# Patient Record
Sex: MALE | Race: WHITE | NOT HISPANIC OR LATINO
[De-identification: names, ages, dates, MRNs, and addresses within clinical notes are randomized per-mention and may not be internally consistent; named-entity substitution may affect disease eponyms.]

---

## 2019-09-19 ENCOUNTER — TRANSCRIPTION ENCOUNTER (OUTPATIENT)
Age: 44
End: 2019-09-19

## 2019-09-30 ENCOUNTER — OUTPATIENT (OUTPATIENT)
Dept: OUTPATIENT SERVICES | Facility: HOSPITAL | Age: 44
LOS: 1 days | End: 2019-09-30
Payer: COMMERCIAL

## 2019-09-30 DIAGNOSIS — Z98.890 OTHER SPECIFIED POSTPROCEDURAL STATES: Chronic | ICD-10-CM

## 2019-09-30 DIAGNOSIS — Z01.818 ENCOUNTER FOR OTHER PREPROCEDURAL EXAMINATION: ICD-10-CM

## 2019-09-30 DIAGNOSIS — Z98.52 VASECTOMY STATUS: Chronic | ICD-10-CM

## 2019-09-30 DIAGNOSIS — Z90.49 ACQUIRED ABSENCE OF OTHER SPECIFIED PARTS OF DIGESTIVE TRACT: Chronic | ICD-10-CM

## 2019-09-30 DIAGNOSIS — J32.9 CHRONIC SINUSITIS, UNSPECIFIED: ICD-10-CM

## 2019-09-30 LAB
ANION GAP SERPL CALC-SCNC: 9 MMOL/L — SIGNIFICANT CHANGE UP (ref 5–17)
BASOPHILS # BLD AUTO: 0.04 K/UL — SIGNIFICANT CHANGE UP (ref 0–0.2)
BASOPHILS NFR BLD AUTO: 0.6 % — SIGNIFICANT CHANGE UP (ref 0–2)
BUN SERPL-MCNC: 15 MG/DL — SIGNIFICANT CHANGE UP (ref 7–23)
CALCIUM SERPL-MCNC: 9 MG/DL — SIGNIFICANT CHANGE UP (ref 8.5–10.1)
CHLORIDE SERPL-SCNC: 104 MMOL/L — SIGNIFICANT CHANGE UP (ref 96–108)
CO2 SERPL-SCNC: 24 MMOL/L — SIGNIFICANT CHANGE UP (ref 22–31)
CREAT SERPL-MCNC: 0.83 MG/DL — SIGNIFICANT CHANGE UP (ref 0.5–1.3)
EOSINOPHIL # BLD AUTO: 0.12 K/UL — SIGNIFICANT CHANGE UP (ref 0–0.5)
EOSINOPHIL NFR BLD AUTO: 1.9 % — SIGNIFICANT CHANGE UP (ref 0–6)
GLUCOSE SERPL-MCNC: 91 MG/DL — SIGNIFICANT CHANGE UP (ref 70–99)
HCT VFR BLD CALC: 43.6 % — SIGNIFICANT CHANGE UP (ref 39–50)
HGB BLD-MCNC: 14.7 G/DL — SIGNIFICANT CHANGE UP (ref 13–17)
IMM GRANULOCYTES NFR BLD AUTO: 0.5 % — SIGNIFICANT CHANGE UP (ref 0–1.5)
LYMPHOCYTES # BLD AUTO: 1.81 K/UL — SIGNIFICANT CHANGE UP (ref 1–3.3)
LYMPHOCYTES # BLD AUTO: 28.1 % — SIGNIFICANT CHANGE UP (ref 13–44)
MCHC RBC-ENTMCNC: 28.2 PG — SIGNIFICANT CHANGE UP (ref 27–34)
MCHC RBC-ENTMCNC: 33.7 GM/DL — SIGNIFICANT CHANGE UP (ref 32–36)
MCV RBC AUTO: 83.5 FL — SIGNIFICANT CHANGE UP (ref 80–100)
MONOCYTES # BLD AUTO: 0.75 K/UL — SIGNIFICANT CHANGE UP (ref 0–0.9)
MONOCYTES NFR BLD AUTO: 11.6 % — SIGNIFICANT CHANGE UP (ref 2–14)
NEUTROPHILS # BLD AUTO: 3.7 K/UL — SIGNIFICANT CHANGE UP (ref 1.8–7.4)
NEUTROPHILS NFR BLD AUTO: 57.3 % — SIGNIFICANT CHANGE UP (ref 43–77)
PLATELET # BLD AUTO: 245 K/UL — SIGNIFICANT CHANGE UP (ref 150–400)
POTASSIUM SERPL-MCNC: 3.3 MMOL/L — LOW (ref 3.5–5.3)
POTASSIUM SERPL-SCNC: 3.3 MMOL/L — LOW (ref 3.5–5.3)
RBC # BLD: 5.22 M/UL — SIGNIFICANT CHANGE UP (ref 4.2–5.8)
RBC # FLD: 13 % — SIGNIFICANT CHANGE UP (ref 10.3–14.5)
SODIUM SERPL-SCNC: 137 MMOL/L — SIGNIFICANT CHANGE UP (ref 135–145)
WBC # BLD: 6.45 K/UL — SIGNIFICANT CHANGE UP (ref 3.8–10.5)
WBC # FLD AUTO: 6.45 K/UL — SIGNIFICANT CHANGE UP (ref 3.8–10.5)

## 2019-09-30 PROCEDURE — 71046 X-RAY EXAM CHEST 2 VIEWS: CPT

## 2019-09-30 PROCEDURE — 36415 COLL VENOUS BLD VENIPUNCTURE: CPT

## 2019-09-30 PROCEDURE — 71046 X-RAY EXAM CHEST 2 VIEWS: CPT | Mod: 26

## 2019-09-30 PROCEDURE — 85025 COMPLETE CBC W/AUTO DIFF WBC: CPT

## 2019-09-30 PROCEDURE — 80048 BASIC METABOLIC PNL TOTAL CA: CPT

## 2019-09-30 PROCEDURE — 93010 ELECTROCARDIOGRAM REPORT: CPT

## 2019-09-30 PROCEDURE — 93005 ELECTROCARDIOGRAM TRACING: CPT

## 2019-09-30 NOTE — ASU PATIENT PROFILE, ADULT - PMH
Hypertension    Migraine    CHRISTINA (obstructive sleep apnea) Deviated septum    Hypertension    Migraine    CHRISTINA (obstructive sleep apnea)

## 2019-09-30 NOTE — ASU PATIENT PROFILE, ADULT - PSH
H/O vasectomy  2009  History of surgery  retrieval of sperm after vasectomy  S/P cholecystectomy  2004

## 2019-09-30 NOTE — CHART NOTE - NSCHARTNOTEFT_GEN_A_CORE
44 year old male presents to PST for planned septoplasty and excision of deidra bullosa    Plan:  1. PST instructions given ; NPO post midnight   2. Pt instructed to take following meds with sip of water : propranolol telmisartan amlodipine   3. EZ wash instructions given   4. Medical Optimization  with Dr Prieto Zabala   5. Stop NSAIDS ( Aspirin Alev Motrin Mobic Diclofenac), herbal supplements , MVI , Vitamin fish oil 7 days prior to surgery  unless directed by surgeon or cardiologist;   6. Labs EKG CXR as per surgeon request

## 2019-10-01 DIAGNOSIS — J32.9 CHRONIC SINUSITIS, UNSPECIFIED: ICD-10-CM

## 2019-10-01 DIAGNOSIS — Z01.818 ENCOUNTER FOR OTHER PREPROCEDURAL EXAMINATION: ICD-10-CM

## 2019-10-09 ENCOUNTER — OUTPATIENT (OUTPATIENT)
Dept: INPATIENT UNIT | Facility: HOSPITAL | Age: 44
LOS: 1 days | Discharge: HOME | End: 2019-10-09
Payer: COMMERCIAL

## 2019-10-09 VITALS
DIASTOLIC BLOOD PRESSURE: 76 MMHG | HEART RATE: 57 BPM | SYSTOLIC BLOOD PRESSURE: 132 MMHG | RESPIRATION RATE: 16 BRPM | OXYGEN SATURATION: 96 % | TEMPERATURE: 98 F

## 2019-10-09 VITALS
HEIGHT: 69 IN | DIASTOLIC BLOOD PRESSURE: 81 MMHG | OXYGEN SATURATION: 96 % | TEMPERATURE: 98 F | SYSTOLIC BLOOD PRESSURE: 127 MMHG | HEART RATE: 62 BPM | RESPIRATION RATE: 16 BRPM | WEIGHT: 255.07 LBS

## 2019-10-09 DIAGNOSIS — Z90.49 ACQUIRED ABSENCE OF OTHER SPECIFIED PARTS OF DIGESTIVE TRACT: Chronic | ICD-10-CM

## 2019-10-09 DIAGNOSIS — J34.89 OTHER SPECIFIED DISORDERS OF NOSE AND NASAL SINUSES: ICD-10-CM

## 2019-10-09 DIAGNOSIS — Z98.890 OTHER SPECIFIED POSTPROCEDURAL STATES: Chronic | ICD-10-CM

## 2019-10-09 DIAGNOSIS — Z98.52 VASECTOMY STATUS: Chronic | ICD-10-CM

## 2019-10-09 DIAGNOSIS — J32.9 CHRONIC SINUSITIS, UNSPECIFIED: ICD-10-CM

## 2019-10-09 PROCEDURE — 88300 SURGICAL PATH GROSS: CPT | Mod: 26

## 2019-10-09 PROCEDURE — 88300 SURGICAL PATH GROSS: CPT

## 2019-10-09 PROCEDURE — C1889: CPT

## 2019-10-09 RX ORDER — FENTANYL CITRATE 50 UG/ML
50 INJECTION INTRAVENOUS
Refills: 0 | Status: DISCONTINUED | OUTPATIENT
Start: 2019-10-09 | End: 2019-10-09

## 2019-10-09 RX ORDER — ONDANSETRON 8 MG/1
4 TABLET, FILM COATED ORAL ONCE
Refills: 0 | Status: DISCONTINUED | OUTPATIENT
Start: 2019-10-09 | End: 2019-10-09

## 2019-10-09 RX ORDER — SODIUM CHLORIDE 9 MG/ML
1000 INJECTION, SOLUTION INTRAVENOUS
Refills: 0 | Status: DISCONTINUED | OUTPATIENT
Start: 2019-10-09 | End: 2019-10-09

## 2019-10-09 RX ORDER — OXYCODONE HYDROCHLORIDE 5 MG/1
5 TABLET ORAL ONCE
Refills: 0 | Status: DISCONTINUED | OUTPATIENT
Start: 2019-10-09 | End: 2019-10-09

## 2019-10-09 RX ORDER — OXYCODONE HYDROCHLORIDE 5 MG/1
10 TABLET ORAL ONCE
Refills: 0 | Status: DISCONTINUED | OUTPATIENT
Start: 2019-10-09 | End: 2019-10-09

## 2019-10-09 RX ADMIN — OXYCODONE HYDROCHLORIDE 10 MILLIGRAM(S): 5 TABLET ORAL at 17:16

## 2019-10-09 RX ADMIN — SODIUM CHLORIDE 125 MILLILITER(S): 9 INJECTION, SOLUTION INTRAVENOUS at 16:50

## 2019-10-09 NOTE — BRIEF OPERATIVE NOTE - NSICDXBRIEFPOSTOP_GEN_ALL_CORE_FT
POST-OP DIAGNOSIS:  Deviated septum 09-Oct-2019 15:19:59  Chevy Choi  Ratna bullosa 09-Oct-2019 15:20:09  Chevy Choi

## 2019-10-09 NOTE — BRIEF OPERATIVE NOTE - NSICDXBRIEFPREOP_GEN_ALL_CORE_FT
PRE-OP DIAGNOSIS:  Deviated septum 09-Oct-2019 15:19:38  Chevy Choi  Ratna bullosa 09-Oct-2019 15:19:51  Chevy Choi

## 2019-10-15 DIAGNOSIS — J34.89 OTHER SPECIFIED DISORDERS OF NOSE AND NASAL SINUSES: ICD-10-CM

## 2019-10-15 DIAGNOSIS — G47.33 OBSTRUCTIVE SLEEP APNEA (ADULT) (PEDIATRIC): ICD-10-CM

## 2019-10-15 DIAGNOSIS — J34.2 DEVIATED NASAL SEPTUM: ICD-10-CM

## 2019-10-15 DIAGNOSIS — G43.709 CHRONIC MIGRAINE WITHOUT AURA, NOT INTRACTABLE, WITHOUT STATUS MIGRAINOSUS: ICD-10-CM

## 2019-10-15 DIAGNOSIS — Z90.49 ACQUIRED ABSENCE OF OTHER SPECIFIED PARTS OF DIGESTIVE TRACT: ICD-10-CM

## 2019-10-15 DIAGNOSIS — I10 ESSENTIAL (PRIMARY) HYPERTENSION: ICD-10-CM

## 2019-10-15 DIAGNOSIS — Z99.89 DEPENDENCE ON OTHER ENABLING MACHINES AND DEVICES: ICD-10-CM

## 2019-10-15 DIAGNOSIS — Z98.52 VASECTOMY STATUS: ICD-10-CM

## 2019-10-15 DIAGNOSIS — K21.9 GASTRO-ESOPHAGEAL REFLUX DISEASE WITHOUT ESOPHAGITIS: ICD-10-CM

## 2019-10-15 DIAGNOSIS — E66.01 MORBID (SEVERE) OBESITY DUE TO EXCESS CALORIES: ICD-10-CM

## 2022-11-04 PROBLEM — G43.909 MIGRAINE, UNSPECIFIED, NOT INTRACTABLE, WITHOUT STATUS MIGRAINOSUS: Chronic | Status: ACTIVE | Noted: 2019-09-30

## 2022-11-04 PROBLEM — J34.2 DEVIATED NASAL SEPTUM: Chronic | Status: ACTIVE | Noted: 2019-09-30

## 2022-11-04 PROBLEM — I10 ESSENTIAL (PRIMARY) HYPERTENSION: Chronic | Status: ACTIVE | Noted: 2019-09-30

## 2022-11-04 PROBLEM — G47.33 OBSTRUCTIVE SLEEP APNEA (ADULT) (PEDIATRIC): Chronic | Status: ACTIVE | Noted: 2019-09-30

## 2022-11-08 ENCOUNTER — OUTPATIENT (OUTPATIENT)
Dept: PREADMISSION | Facility: HOSPITAL | Age: 47
LOS: 1 days | Discharge: HOME | End: 2022-11-08
Payer: COMMERCIAL

## 2022-11-08 VITALS
HEART RATE: 56 BPM | OXYGEN SATURATION: 98 % | HEIGHT: 69 IN | TEMPERATURE: 98 F | DIASTOLIC BLOOD PRESSURE: 82 MMHG | WEIGHT: 265 LBS | SYSTOLIC BLOOD PRESSURE: 129 MMHG | RESPIRATION RATE: 17 BRPM

## 2022-11-08 DIAGNOSIS — Z98.52 VASECTOMY STATUS: Chronic | ICD-10-CM

## 2022-11-08 DIAGNOSIS — Z98.890 OTHER SPECIFIED POSTPROCEDURAL STATES: Chronic | ICD-10-CM

## 2022-11-08 DIAGNOSIS — Z90.49 ACQUIRED ABSENCE OF OTHER SPECIFIED PARTS OF DIGESTIVE TRACT: Chronic | ICD-10-CM

## 2022-11-08 DIAGNOSIS — R19.5 OTHER FECAL ABNORMALITIES: ICD-10-CM

## 2022-11-08 PROCEDURE — C1889: CPT

## 2022-11-08 PROCEDURE — 88305 TISSUE EXAM BY PATHOLOGIST: CPT | Mod: 26

## 2022-11-08 PROCEDURE — 88305 TISSUE EXAM BY PATHOLOGIST: CPT

## 2022-11-08 RX ORDER — CALCIUM CARBONATE 500(1250)
0 TABLET ORAL
Qty: 0 | Refills: 0 | DISCHARGE

## 2022-11-08 NOTE — ASU PATIENT PROFILE, ADULT - NSICDXPASTMEDICALHX_GEN_ALL_CORE_FT
PAST MEDICAL HISTORY:  Deviated septum     Hypertension     Migraine     CHRISTINA (obstructive sleep apnea)

## 2022-11-08 NOTE — ASU PATIENT PROFILE, ADULT - FALL HARM RISK - UNIVERSAL INTERVENTIONS
Bed in lowest position, wheels locked, appropriate side rails in place/Call bell, personal items and telephone in reach/Instruct patient to call for assistance before getting out of bed or chair/Non-slip footwear when patient is out of bed/[place] to call system/Physically safe environment - no spills, clutter or unnecessary equipment/Purposeful Proactive Rounding/Room/bathroom lighting operational, light cord in reach

## 2022-11-08 NOTE — ASU PATIENT PROFILE, ADULT - DOMESTIC TRAVEL HIGH RISK QUESTION
Bedside and Verbal shift change report given to Lore Bernabe (oncoming nurse) by Arnol Cortez (offgoing nurse). Report included the following information SBAR, Kardex, Intake/Output, MAR and Recent Results. No

## 2022-11-11 DIAGNOSIS — Z83.71 FAMILY HISTORY OF COLONIC POLYPS: ICD-10-CM

## 2022-11-11 DIAGNOSIS — I10 ESSENTIAL (PRIMARY) HYPERTENSION: ICD-10-CM

## 2022-11-11 DIAGNOSIS — Z80.0 FAMILY HISTORY OF MALIGNANT NEOPLASM OF DIGESTIVE ORGANS: ICD-10-CM

## 2022-11-11 DIAGNOSIS — G47.33 OBSTRUCTIVE SLEEP APNEA (ADULT) (PEDIATRIC): ICD-10-CM

## 2022-11-11 DIAGNOSIS — K62.9 DISEASE OF ANUS AND RECTUM, UNSPECIFIED: ICD-10-CM

## 2022-11-11 DIAGNOSIS — K57.30 DIVERTICULOSIS OF LARGE INTESTINE WITHOUT PERFORATION OR ABSCESS WITHOUT BLEEDING: ICD-10-CM

## 2022-11-11 DIAGNOSIS — D12.5 BENIGN NEOPLASM OF SIGMOID COLON: ICD-10-CM

## 2023-10-22 ENCOUNTER — INPATIENT (INPATIENT)
Facility: HOSPITAL | Age: 48
LOS: 3 days | Discharge: HOME | DRG: 391 | End: 2023-10-26
Attending: SURGERY | Admitting: SURGERY
Payer: COMMERCIAL

## 2023-10-22 VITALS — WEIGHT: 276.9 LBS

## 2023-10-22 DIAGNOSIS — K57.20 DIVERTICULITIS OF LARGE INTESTINE WITH PERFORATION AND ABSCESS WITHOUT BLEEDING: ICD-10-CM

## 2023-10-22 DIAGNOSIS — Z90.49 ACQUIRED ABSENCE OF OTHER SPECIFIED PARTS OF DIGESTIVE TRACT: Chronic | ICD-10-CM

## 2023-10-22 DIAGNOSIS — Z98.890 OTHER SPECIFIED POSTPROCEDURAL STATES: Chronic | ICD-10-CM

## 2023-10-22 DIAGNOSIS — Z98.52 VASECTOMY STATUS: Chronic | ICD-10-CM

## 2023-10-22 LAB
ALBUMIN SERPL ELPH-MCNC: 3.7 G/DL — SIGNIFICANT CHANGE UP (ref 3.3–5)
ALBUMIN SERPL ELPH-MCNC: 3.7 G/DL — SIGNIFICANT CHANGE UP (ref 3.3–5)
ALP SERPL-CCNC: 88 U/L — SIGNIFICANT CHANGE UP (ref 40–120)
ALP SERPL-CCNC: 88 U/L — SIGNIFICANT CHANGE UP (ref 40–120)
ALT FLD-CCNC: 62 U/L — SIGNIFICANT CHANGE UP (ref 12–78)
ALT FLD-CCNC: 62 U/L — SIGNIFICANT CHANGE UP (ref 12–78)
ANION GAP SERPL CALC-SCNC: 7 MMOL/L — SIGNIFICANT CHANGE UP (ref 5–17)
ANION GAP SERPL CALC-SCNC: 7 MMOL/L — SIGNIFICANT CHANGE UP (ref 5–17)
APPEARANCE UR: CLEAR — SIGNIFICANT CHANGE UP
APPEARANCE UR: CLEAR — SIGNIFICANT CHANGE UP
AST SERPL-CCNC: 15 U/L — SIGNIFICANT CHANGE UP (ref 15–37)
AST SERPL-CCNC: 15 U/L — SIGNIFICANT CHANGE UP (ref 15–37)
BASOPHILS # BLD AUTO: 0 K/UL — SIGNIFICANT CHANGE UP (ref 0–0.2)
BASOPHILS # BLD AUTO: 0 K/UL — SIGNIFICANT CHANGE UP (ref 0–0.2)
BASOPHILS NFR BLD AUTO: 0 % — SIGNIFICANT CHANGE UP (ref 0–2)
BASOPHILS NFR BLD AUTO: 0 % — SIGNIFICANT CHANGE UP (ref 0–2)
BILIRUB SERPL-MCNC: 0.9 MG/DL — SIGNIFICANT CHANGE UP (ref 0.2–1.2)
BILIRUB SERPL-MCNC: 0.9 MG/DL — SIGNIFICANT CHANGE UP (ref 0.2–1.2)
BILIRUB UR-MCNC: NEGATIVE — SIGNIFICANT CHANGE UP
BILIRUB UR-MCNC: NEGATIVE — SIGNIFICANT CHANGE UP
BUN SERPL-MCNC: 11 MG/DL — SIGNIFICANT CHANGE UP (ref 7–23)
BUN SERPL-MCNC: 11 MG/DL — SIGNIFICANT CHANGE UP (ref 7–23)
CALCIUM SERPL-MCNC: 9.3 MG/DL — SIGNIFICANT CHANGE UP (ref 8.5–10.1)
CALCIUM SERPL-MCNC: 9.3 MG/DL — SIGNIFICANT CHANGE UP (ref 8.5–10.1)
CHLORIDE SERPL-SCNC: 101 MMOL/L — SIGNIFICANT CHANGE UP (ref 96–108)
CHLORIDE SERPL-SCNC: 101 MMOL/L — SIGNIFICANT CHANGE UP (ref 96–108)
CO2 SERPL-SCNC: 28 MMOL/L — SIGNIFICANT CHANGE UP (ref 22–31)
CO2 SERPL-SCNC: 28 MMOL/L — SIGNIFICANT CHANGE UP (ref 22–31)
COLOR SPEC: YELLOW — SIGNIFICANT CHANGE UP
COLOR SPEC: YELLOW — SIGNIFICANT CHANGE UP
CREAT SERPL-MCNC: 0.81 MG/DL — SIGNIFICANT CHANGE UP (ref 0.5–1.3)
CREAT SERPL-MCNC: 0.81 MG/DL — SIGNIFICANT CHANGE UP (ref 0.5–1.3)
DIFF PNL FLD: NEGATIVE — SIGNIFICANT CHANGE UP
DIFF PNL FLD: NEGATIVE — SIGNIFICANT CHANGE UP
EGFR: 110 ML/MIN/1.73M2 — SIGNIFICANT CHANGE UP
EGFR: 110 ML/MIN/1.73M2 — SIGNIFICANT CHANGE UP
EOSINOPHIL # BLD AUTO: 0 K/UL — SIGNIFICANT CHANGE UP (ref 0–0.5)
EOSINOPHIL # BLD AUTO: 0 K/UL — SIGNIFICANT CHANGE UP (ref 0–0.5)
EOSINOPHIL NFR BLD AUTO: 0 % — SIGNIFICANT CHANGE UP (ref 0–6)
EOSINOPHIL NFR BLD AUTO: 0 % — SIGNIFICANT CHANGE UP (ref 0–6)
GLUCOSE SERPL-MCNC: 89 MG/DL — SIGNIFICANT CHANGE UP (ref 70–99)
GLUCOSE SERPL-MCNC: 89 MG/DL — SIGNIFICANT CHANGE UP (ref 70–99)
GLUCOSE UR QL: NEGATIVE MG/DL — SIGNIFICANT CHANGE UP
GLUCOSE UR QL: NEGATIVE MG/DL — SIGNIFICANT CHANGE UP
HCT VFR BLD CALC: 42.6 % — SIGNIFICANT CHANGE UP (ref 39–50)
HCT VFR BLD CALC: 42.6 % — SIGNIFICANT CHANGE UP (ref 39–50)
HGB BLD-MCNC: 14.5 G/DL — SIGNIFICANT CHANGE UP (ref 13–17)
HGB BLD-MCNC: 14.5 G/DL — SIGNIFICANT CHANGE UP (ref 13–17)
KETONES UR-MCNC: NEGATIVE MG/DL — SIGNIFICANT CHANGE UP
KETONES UR-MCNC: NEGATIVE MG/DL — SIGNIFICANT CHANGE UP
LEUKOCYTE ESTERASE UR-ACNC: NEGATIVE — SIGNIFICANT CHANGE UP
LEUKOCYTE ESTERASE UR-ACNC: NEGATIVE — SIGNIFICANT CHANGE UP
LYMPHOCYTES # BLD AUTO: 1.49 K/UL — SIGNIFICANT CHANGE UP (ref 1–3.3)
LYMPHOCYTES # BLD AUTO: 1.49 K/UL — SIGNIFICANT CHANGE UP (ref 1–3.3)
LYMPHOCYTES # BLD AUTO: 13 % — SIGNIFICANT CHANGE UP (ref 13–44)
LYMPHOCYTES # BLD AUTO: 13 % — SIGNIFICANT CHANGE UP (ref 13–44)
MANUAL SMEAR VERIFICATION: SIGNIFICANT CHANGE UP
MANUAL SMEAR VERIFICATION: SIGNIFICANT CHANGE UP
MCHC RBC-ENTMCNC: 28.4 PG — SIGNIFICANT CHANGE UP (ref 27–34)
MCHC RBC-ENTMCNC: 28.4 PG — SIGNIFICANT CHANGE UP (ref 27–34)
MCHC RBC-ENTMCNC: 34 GM/DL — SIGNIFICANT CHANGE UP (ref 32–36)
MCHC RBC-ENTMCNC: 34 GM/DL — SIGNIFICANT CHANGE UP (ref 32–36)
MCV RBC AUTO: 83.4 FL — SIGNIFICANT CHANGE UP (ref 80–100)
MCV RBC AUTO: 83.4 FL — SIGNIFICANT CHANGE UP (ref 80–100)
MONOCYTES # BLD AUTO: 1.84 K/UL — HIGH (ref 0–0.9)
MONOCYTES # BLD AUTO: 1.84 K/UL — HIGH (ref 0–0.9)
MONOCYTES NFR BLD AUTO: 16 % — HIGH (ref 2–14)
MONOCYTES NFR BLD AUTO: 16 % — HIGH (ref 2–14)
NEUTROPHILS # BLD AUTO: 8.16 K/UL — HIGH (ref 1.8–7.4)
NEUTROPHILS # BLD AUTO: 8.16 K/UL — HIGH (ref 1.8–7.4)
NEUTROPHILS NFR BLD AUTO: 71 % — SIGNIFICANT CHANGE UP (ref 43–77)
NEUTROPHILS NFR BLD AUTO: 71 % — SIGNIFICANT CHANGE UP (ref 43–77)
NITRITE UR-MCNC: NEGATIVE — SIGNIFICANT CHANGE UP
NITRITE UR-MCNC: NEGATIVE — SIGNIFICANT CHANGE UP
NRBC # BLD: 0 /100 — SIGNIFICANT CHANGE UP (ref 0–0)
NRBC # BLD: 0 /100 — SIGNIFICANT CHANGE UP (ref 0–0)
NRBC # BLD: SIGNIFICANT CHANGE UP /100 WBCS (ref 0–0)
NRBC # BLD: SIGNIFICANT CHANGE UP /100 WBCS (ref 0–0)
PH UR: 7 — SIGNIFICANT CHANGE UP (ref 5–8)
PH UR: 7 — SIGNIFICANT CHANGE UP (ref 5–8)
PLAT MORPH BLD: NORMAL — SIGNIFICANT CHANGE UP
PLAT MORPH BLD: NORMAL — SIGNIFICANT CHANGE UP
PLATELET # BLD AUTO: 285 K/UL — SIGNIFICANT CHANGE UP (ref 150–400)
PLATELET # BLD AUTO: 285 K/UL — SIGNIFICANT CHANGE UP (ref 150–400)
POTASSIUM SERPL-MCNC: 3.5 MMOL/L — SIGNIFICANT CHANGE UP (ref 3.5–5.3)
POTASSIUM SERPL-MCNC: 3.5 MMOL/L — SIGNIFICANT CHANGE UP (ref 3.5–5.3)
POTASSIUM SERPL-SCNC: 3.5 MMOL/L — SIGNIFICANT CHANGE UP (ref 3.5–5.3)
POTASSIUM SERPL-SCNC: 3.5 MMOL/L — SIGNIFICANT CHANGE UP (ref 3.5–5.3)
PROT SERPL-MCNC: 7.7 GM/DL — SIGNIFICANT CHANGE UP (ref 6–8.3)
PROT SERPL-MCNC: 7.7 GM/DL — SIGNIFICANT CHANGE UP (ref 6–8.3)
PROT UR-MCNC: NEGATIVE MG/DL — SIGNIFICANT CHANGE UP
PROT UR-MCNC: NEGATIVE MG/DL — SIGNIFICANT CHANGE UP
RBC # BLD: 5.11 M/UL — SIGNIFICANT CHANGE UP (ref 4.2–5.8)
RBC # BLD: 5.11 M/UL — SIGNIFICANT CHANGE UP (ref 4.2–5.8)
RBC # FLD: 13.1 % — SIGNIFICANT CHANGE UP (ref 10.3–14.5)
RBC # FLD: 13.1 % — SIGNIFICANT CHANGE UP (ref 10.3–14.5)
RBC BLD AUTO: NORMAL — SIGNIFICANT CHANGE UP
RBC BLD AUTO: NORMAL — SIGNIFICANT CHANGE UP
SODIUM SERPL-SCNC: 136 MMOL/L — SIGNIFICANT CHANGE UP (ref 135–145)
SODIUM SERPL-SCNC: 136 MMOL/L — SIGNIFICANT CHANGE UP (ref 135–145)
SP GR SPEC: 1.01 — SIGNIFICANT CHANGE UP (ref 1–1.03)
SP GR SPEC: 1.01 — SIGNIFICANT CHANGE UP (ref 1–1.03)
UROBILINOGEN FLD QL: 1 MG/DL — SIGNIFICANT CHANGE UP (ref 0.2–1)
UROBILINOGEN FLD QL: 1 MG/DL — SIGNIFICANT CHANGE UP (ref 0.2–1)
WBC # BLD: 11.49 K/UL — HIGH (ref 3.8–10.5)
WBC # BLD: 11.49 K/UL — HIGH (ref 3.8–10.5)
WBC # FLD AUTO: 11.49 K/UL — HIGH (ref 3.8–10.5)
WBC # FLD AUTO: 11.49 K/UL — HIGH (ref 3.8–10.5)

## 2023-10-22 PROCEDURE — 99285 EMERGENCY DEPT VISIT HI MDM: CPT

## 2023-10-22 PROCEDURE — 12345: CPT | Mod: NC

## 2023-10-22 PROCEDURE — 80048 BASIC METABOLIC PNL TOTAL CA: CPT

## 2023-10-22 PROCEDURE — 85025 COMPLETE CBC W/AUTO DIFF WBC: CPT

## 2023-10-22 PROCEDURE — 74176 CT ABD & PELVIS W/O CONTRAST: CPT | Mod: 26,MA

## 2023-10-22 PROCEDURE — 93005 ELECTROCARDIOGRAM TRACING: CPT

## 2023-10-22 PROCEDURE — 36415 COLL VENOUS BLD VENIPUNCTURE: CPT

## 2023-10-22 RX ORDER — PIPERACILLIN AND TAZOBACTAM 4; .5 G/20ML; G/20ML
3.38 INJECTION, POWDER, LYOPHILIZED, FOR SOLUTION INTRAVENOUS EVERY 8 HOURS
Refills: 0 | Status: DISCONTINUED | OUTPATIENT
Start: 2023-10-23 | End: 2023-10-26

## 2023-10-22 RX ORDER — HEPARIN SODIUM 5000 [USP'U]/ML
5000 INJECTION INTRAVENOUS; SUBCUTANEOUS EVERY 8 HOURS
Refills: 0 | Status: DISCONTINUED | OUTPATIENT
Start: 2023-10-22 | End: 2023-10-26

## 2023-10-22 RX ORDER — INFLUENZA VIRUS VACCINE 15; 15; 15; 15 UG/.5ML; UG/.5ML; UG/.5ML; UG/.5ML
0.5 SUSPENSION INTRAMUSCULAR ONCE
Refills: 0 | Status: COMPLETED | OUTPATIENT
Start: 2023-10-22 | End: 2023-10-22

## 2023-10-22 RX ORDER — CIPROFLOXACIN LACTATE 400MG/40ML
400 VIAL (ML) INTRAVENOUS ONCE
Refills: 0 | Status: COMPLETED | OUTPATIENT
Start: 2023-10-22 | End: 2023-10-22

## 2023-10-22 RX ORDER — SODIUM CHLORIDE 9 MG/ML
1000 INJECTION INTRAMUSCULAR; INTRAVENOUS; SUBCUTANEOUS
Refills: 0 | Status: DISCONTINUED | OUTPATIENT
Start: 2023-10-22 | End: 2023-10-22

## 2023-10-22 RX ORDER — CALCIUM CARBONATE 500(1250)
1 TABLET ORAL
Refills: 0 | DISCHARGE

## 2023-10-22 RX ORDER — CHLORTHALIDONE 50 MG
25 TABLET ORAL DAILY
Refills: 0 | Status: DISCONTINUED | OUTPATIENT
Start: 2023-10-22 | End: 2023-10-23

## 2023-10-22 RX ORDER — AMLODIPINE BESYLATE 2.5 MG/1
5 TABLET ORAL DAILY
Refills: 0 | Status: DISCONTINUED | OUTPATIENT
Start: 2023-10-22 | End: 2023-10-26

## 2023-10-22 RX ORDER — PROPRANOLOL HCL 160 MG
80 CAPSULE, EXTENDED RELEASE 24HR ORAL DAILY
Refills: 0 | Status: DISCONTINUED | OUTPATIENT
Start: 2023-10-22 | End: 2023-10-26

## 2023-10-22 RX ORDER — SODIUM CHLORIDE 9 MG/ML
1000 INJECTION INTRAMUSCULAR; INTRAVENOUS; SUBCUTANEOUS ONCE
Refills: 0 | Status: COMPLETED | OUTPATIENT
Start: 2023-10-22 | End: 2023-10-22

## 2023-10-22 RX ORDER — MORPHINE SULFATE 50 MG/1
2 CAPSULE, EXTENDED RELEASE ORAL EVERY 4 HOURS
Refills: 0 | Status: DISCONTINUED | OUTPATIENT
Start: 2023-10-22 | End: 2023-10-22

## 2023-10-22 RX ORDER — LOSARTAN POTASSIUM 100 MG/1
50 TABLET, FILM COATED ORAL DAILY
Refills: 0 | Status: DISCONTINUED | OUTPATIENT
Start: 2023-10-22 | End: 2023-10-23

## 2023-10-22 RX ORDER — AMLODIPINE BESYLATE 2.5 MG/1
1 TABLET ORAL
Qty: 0 | Refills: 0 | DISCHARGE

## 2023-10-22 RX ORDER — PROPRANOLOL HCL 160 MG
1 CAPSULE, EXTENDED RELEASE 24HR ORAL
Qty: 0 | Refills: 0 | DISCHARGE

## 2023-10-22 RX ORDER — TELMISARTAN 20 MG/1
40 TABLET ORAL DAILY
Refills: 0 | Status: DISCONTINUED | OUTPATIENT
Start: 2023-10-22 | End: 2023-10-26

## 2023-10-22 RX ORDER — DEXTROSE MONOHYDRATE, SODIUM CHLORIDE, AND POTASSIUM CHLORIDE 50; .745; 4.5 G/1000ML; G/1000ML; G/1000ML
1000 INJECTION, SOLUTION INTRAVENOUS
Refills: 0 | Status: DISCONTINUED | OUTPATIENT
Start: 2023-10-22 | End: 2023-10-26

## 2023-10-22 RX ORDER — TELMISARTAN 20 MG/1
1 TABLET ORAL
Qty: 0 | Refills: 0 | DISCHARGE

## 2023-10-22 RX ORDER — PIPERACILLIN AND TAZOBACTAM 4; .5 G/20ML; G/20ML
3.38 INJECTION, POWDER, LYOPHILIZED, FOR SOLUTION INTRAVENOUS ONCE
Refills: 0 | Status: COMPLETED | OUTPATIENT
Start: 2023-10-22 | End: 2023-10-22

## 2023-10-22 RX ORDER — ONDANSETRON 8 MG/1
4 TABLET, FILM COATED ORAL EVERY 6 HOURS
Refills: 0 | Status: DISCONTINUED | OUTPATIENT
Start: 2023-10-22 | End: 2023-10-26

## 2023-10-22 RX ORDER — PIPERACILLIN AND TAZOBACTAM 4; .5 G/20ML; G/20ML
3.38 INJECTION, POWDER, LYOPHILIZED, FOR SOLUTION INTRAVENOUS ONCE
Refills: 0 | Status: COMPLETED | OUTPATIENT
Start: 2023-10-23 | End: 2023-10-23

## 2023-10-22 RX ORDER — ACETAMINOPHEN 500 MG
1000 TABLET ORAL ONCE
Refills: 0 | Status: COMPLETED | OUTPATIENT
Start: 2023-10-22 | End: 2023-10-24

## 2023-10-22 RX ORDER — TELMISARTAN 20 MG/1
1 TABLET ORAL
Refills: 0 | DISCHARGE

## 2023-10-22 RX ORDER — METRONIDAZOLE 500 MG
500 TABLET ORAL ONCE
Refills: 0 | Status: COMPLETED | OUTPATIENT
Start: 2023-10-22 | End: 2023-10-22

## 2023-10-22 RX ADMIN — PIPERACILLIN AND TAZOBACTAM 200 GRAM(S): 4; .5 INJECTION, POWDER, LYOPHILIZED, FOR SOLUTION INTRAVENOUS at 22:23

## 2023-10-22 RX ADMIN — DEXTROSE MONOHYDRATE, SODIUM CHLORIDE, AND POTASSIUM CHLORIDE 125 MILLILITER(S): 50; .745; 4.5 INJECTION, SOLUTION INTRAVENOUS at 20:35

## 2023-10-22 RX ADMIN — Medication 200 MILLIGRAM(S): at 20:25

## 2023-10-22 RX ADMIN — SODIUM CHLORIDE 1000 MILLILITER(S): 9 INJECTION INTRAMUSCULAR; INTRAVENOUS; SUBCUTANEOUS at 18:06

## 2023-10-22 RX ADMIN — HEPARIN SODIUM 5000 UNIT(S): 5000 INJECTION INTRAVENOUS; SUBCUTANEOUS at 21:02

## 2023-10-22 RX ADMIN — SODIUM CHLORIDE 125 MILLILITER(S): 9 INJECTION INTRAMUSCULAR; INTRAVENOUS; SUBCUTANEOUS at 19:19

## 2023-10-22 RX ADMIN — Medication 100 MILLIGRAM(S): at 19:18

## 2023-10-22 NOTE — ED ADULT NURSE NOTE - PRIMARY CARE PROVIDER
[facility] phone call message- medication clarification/question:    Full Medication Name: testosterone (ANDROGEL 1.62 % PUMP) 20.25 MG/ACT gel    Question: The pharmacy states the NDC is  or not valid anymore and would like a call back to discuss options.    Pharmacy confirmed as      [id number] - [place], [place] - [address]    : Yes    OK to leave a message on voice mail? Yes    Primary language: English      needed? No    Call taken on Ruba 15, 2023 at 3:43 PM by Sandra Gasca     see md note

## 2023-10-22 NOTE — ED ADULT NURSE NOTE - OBJECTIVE STATEMENT
pt presenting for abd pain for a week, now w/diarrhea. denies abd HX, went to UC and sent here, primary is out of town

## 2023-10-22 NOTE — H&P ADULT - NSHPPHYSICALEXAM_GEN_ALL_CORE
VSS afeb  49 yo male WDWN in NAD  skin- warm,dry  HEENT- WNL  Neck- no JVD  Lungs- clear  cor- RRR  Abd- + BS soft, tender LLQ, guards to deep palpation  Ext- no edema  Neuro A and O X 3 no deficits

## 2023-10-22 NOTE — ED ADULT TRIAGE NOTE - CHIEF COMPLAINT QUOTE
pt presents to ED due to complaints of abdominal pain x 1 week LLQ sent by urgent care for sono or CT diarrhea today

## 2023-10-22 NOTE — ED STATDOCS - PROGRESS NOTE DETAILS
pt aware of lab and ct results, Dr. Child at bedside and will admit for further management.  -Madyson Polo PA-C

## 2023-10-22 NOTE — ED STATDOCS - PHYSICAL EXAMINATION
Constitutional: NAD AOx3  Eyes: PERRL EOMI  Head: Normocephalic atraumatic  Mouth: MMM  Cardiac: regular rate and rhythm  Resp: Lungs CTAB  GI: Abd s/nd/ttp LLQ and suprapubic  Neuro: CN2-12 grossly intact, MCKENNA x 4  Skin: No visible rashes

## 2023-10-22 NOTE — ED ADULT NURSE NOTE - NSFALLRISK_ED_ALL_ED
The history is provided by the mother. Illness   The current episode started yesterday. The onset was gradual. The problem occurs occasionally. The problem has been unchanged. The problem is mild. Nothing relieves the symptoms. Associated symptoms include a fever, ear pain, rhinorrhea and URI. Pertinent negatives include no abdominal pain, no constipation, no diarrhea, no vomiting, no congestion, no ear discharge, no sore throat, no stridor, no cough, no wheezing, no rash, no eye discharge and no eye pain. Review of Systems   Constitutional: Positive for fever and irritability. Negative for activity change and appetite change. HENT: Positive for ear pain and rhinorrhea. Negative for congestion, ear discharge and sore throat. Eyes: Negative for pain and discharge. Respiratory: Negative for cough, wheezing and stridor. Cardiovascular: Negative for cyanosis. Gastrointestinal: Negative for abdominal distention, abdominal pain, constipation, diarrhea and vomiting. Genitourinary: Negative for decreased urine volume, dysuria and frequency. Skin: Negative for rash and wound. Neurological: Negative for weakness. All other systems reviewed and are negative. Physical Exam  Vitals and nursing note reviewed. Constitutional:       General: She is active. She is not in acute distress. Appearance: She is well-developed. She is not diaphoretic. HENT:      Right Ear: Tympanic membrane and external ear normal.      Left Ear: External ear normal. A middle ear effusion is present. Tympanic membrane is erythematous and bulging. Nose: Mucosal edema and rhinorrhea present. Mouth/Throat:      Mouth: Mucous membranes are moist.      Pharynx: Oropharynx is clear. Tonsils: No tonsillar exudate. Eyes:      Conjunctiva/sclera: Conjunctivae normal.      Pupils: Pupils are equal, round, and reactive to light. Cardiovascular:      Rate and Rhythm: Normal rate and regular rhythm. Heart sounds: S1 normal and S2 normal. No murmur heard. Pulmonary:      Effort: Pulmonary effort is normal. No respiratory distress or retractions. Breath sounds: Normal breath sounds. No stridor. No wheezing. Abdominal:      General: Bowel sounds are normal.      Palpations: Abdomen is soft. Tenderness: There is no abdominal tenderness. There is no guarding or rebound. Musculoskeletal:         General: Normal range of motion. Cervical back: Normal range of motion and neck supple. Skin:     General: Skin is warm. Findings: No petechiae or rash. Neurological:      Mental Status: She is alert. Motor: No abnormal muscle tone.        --------------------------------------------- PAST HISTORY ---------------------------------------------  Past Medical History:  has no past medical history on file. Past Surgical History:  has no past surgical history on file. Social History:      Family History: family history is not on file. The patients home medications have been reviewed. Allergies: Patient has no known allergies. -------------------------------------------------- RESULTS -------------------------------------------------  No results found for this visit on 08/23/21. No orders to display       ------------------------- NURSING NOTES AND VITALS REVIEWED ---------------------------   The nursing notes within the ED encounter and vital signs as below have been reviewed. Pulse 160 Comment: pt upset and crying  Temp 97.8 °F (36.6 °C) (Temporal)   Resp 20 Comment: crying  Wt 21 lb 12.8 oz (9.888 kg)   SpO2 97%   Oxygen Saturation Interpretation: Normal      ------------------------------------------ PROGRESS NOTES ------------------------------------------   I have spoken with the mother and discussed todays results, in addition to providing specific details for the plan of care and counseling regarding the diagnosis and prognosis.   Their questions are answered at this time and they are agreeable with the plan.      --------------------------------- ADDITIONAL PROVIDER NOTES ---------------------------------        This patient is stable for discharge. I have shared the specific conditions for return, as well as the importance of follow-up. IMPRESSION:     1. Non-recurrent acute serous otitis media of left ear    2.  Upper respiratory tract infection, unspecified type      Patient's Medications   New Prescriptions    AMOXICILLIN (AMOXIL) 200 MG/5ML SUSPENSION    Take 5.6 mLs by mouth 2 times daily for 10 days    IBUPROFEN (CHILDRENS ADVIL) 100 MG/5ML SUSPENSION    Take 2.5 mLs by mouth every 6 hours as needed for Pain or Fever    PREDNISOLONE (ORAPRED) 15 MG/5ML SOLUTION    Take 3.3 mLs by mouth daily for 5 doses   Previous Medications    No medications on file   Modified Medications    No medications on file   Discontinued Medications    No medications on file         Procedures     [facility]                  Ida Das, DO  08/23/21 [id number] No

## 2023-10-22 NOTE — ED STATDOCS - NS ED ATTENDING STATEMENT MOD
This was a shared visit with the KELSIE. I reviewed and verified the documentation and independently performed the documented:

## 2023-10-22 NOTE — ED STATDOCS - OBJECTIVE STATEMENT
49 yo M with pmhx htn migraine, cholecystectomy with c/o few days of LLQ pain. no fever. No n/v. +diarrhea. Pain comes and goes and is very sharp, radiates to bladder area. No testicle pain or back pain. No urine symptoms. No hx of kidney stone or diverticulitis. Went to UC and sent to ED for further eval.

## 2023-10-22 NOTE — H&P ADULT - NSHPLABSRESULTS_GEN_ALL_CORE
< from: CT Abdomen and Pelvis No Cont (10.22.23 @ 18:47) >    IMPRESSION:    Limited noncontrast study.    Acute mid sigmoid colon diverticulitis. Trace adjacent droplets of   extraluminal air and 2.2 cm perisigmoid air/fluid on image 39 series 602,   indicating localized perforation. No drainable fluid collection. No   distant free air. Follow to resolution. Recommend colonoscopy once acute   symptoms have resolved.    No hydronephrosis or obstructing ureteral calculus. 2 mm nonobstructing   left renal calculus.    Sub-4 mm pulmonary nodules can be followed with dedicated chest CT in 12   months, or as per patient risk factors.    < end of copied text >

## 2023-10-22 NOTE — PHARMACOTHERAPY INTERVENTION NOTE - COMMENTS
Medication history complete. Medications and allergies reviewed with patient and confirmed with . Patient uses a CPAP machine.

## 2023-10-22 NOTE — ED STATDOCS - NSICDXPASTSURGICALHX_GEN_ALL_CORE_FT
PAST SURGICAL HISTORY:  H/O vasectomy 2009    History of surgery retrieval of sperm after vasectomy    S/P cholecystectomy 2004

## 2023-10-22 NOTE — H&P ADULT - ASSESSMENT
47 yo male with diverticulitis and microperforation. Plan admit. NPO. IV hydration. IV abx. GI and medicine consult. Serial abdominal exams and follow up labs

## 2023-10-22 NOTE — ED ADULT NURSE NOTE - NSFALLUNIVINTERV_ED_ALL_ED
Bed/Stretcher in lowest position, wheels locked, appropriate side rails in place/Call bell, personal items and telephone in reach/Instruct patient to call for assistance before getting out of bed/chair/stretcher/Non-slip footwear applied when patient is off stretcher/[place] to call system/Physically safe environment - no spills, clutter or unnecessary equipment/Purposeful proactive rounding/Room/bathroom lighting operational, light cord in reach

## 2023-10-22 NOTE — H&P ADULT - HISTORY OF PRESENT ILLNESS
ED 47 yo M with pmhx htn migraine, cholecystectomy with c/o few days of LLQ pain. no fever. No n/v. +diarrhea. Pain comes and goes and is very sharp, radiates to bladder area. No testicle pain or back pain. No urine symptoms. No hx of kidney stone or diverticulitis. Went to  and sent to ED for further eval.

## 2023-10-23 LAB
ANION GAP SERPL CALC-SCNC: 4 MMOL/L — LOW (ref 5–17)
ANION GAP SERPL CALC-SCNC: 4 MMOL/L — LOW (ref 5–17)
BASOPHILS # BLD AUTO: 0.03 K/UL — SIGNIFICANT CHANGE UP (ref 0–0.2)
BASOPHILS # BLD AUTO: 0.03 K/UL — SIGNIFICANT CHANGE UP (ref 0–0.2)
BASOPHILS NFR BLD AUTO: 0.3 % — SIGNIFICANT CHANGE UP (ref 0–2)
BASOPHILS NFR BLD AUTO: 0.3 % — SIGNIFICANT CHANGE UP (ref 0–2)
BUN SERPL-MCNC: 10 MG/DL — SIGNIFICANT CHANGE UP (ref 7–23)
BUN SERPL-MCNC: 10 MG/DL — SIGNIFICANT CHANGE UP (ref 7–23)
CALCIUM SERPL-MCNC: 9 MG/DL — SIGNIFICANT CHANGE UP (ref 8.5–10.1)
CALCIUM SERPL-MCNC: 9 MG/DL — SIGNIFICANT CHANGE UP (ref 8.5–10.1)
CHLORIDE SERPL-SCNC: 106 MMOL/L — SIGNIFICANT CHANGE UP (ref 96–108)
CHLORIDE SERPL-SCNC: 106 MMOL/L — SIGNIFICANT CHANGE UP (ref 96–108)
CO2 SERPL-SCNC: 28 MMOL/L — SIGNIFICANT CHANGE UP (ref 22–31)
CO2 SERPL-SCNC: 28 MMOL/L — SIGNIFICANT CHANGE UP (ref 22–31)
CREAT SERPL-MCNC: 0.8 MG/DL — SIGNIFICANT CHANGE UP (ref 0.5–1.3)
CREAT SERPL-MCNC: 0.8 MG/DL — SIGNIFICANT CHANGE UP (ref 0.5–1.3)
CULTURE RESULTS: SIGNIFICANT CHANGE UP
CULTURE RESULTS: SIGNIFICANT CHANGE UP
EGFR: 111 ML/MIN/1.73M2 — SIGNIFICANT CHANGE UP
EGFR: 111 ML/MIN/1.73M2 — SIGNIFICANT CHANGE UP
EOSINOPHIL # BLD AUTO: 0.07 K/UL — SIGNIFICANT CHANGE UP (ref 0–0.5)
EOSINOPHIL # BLD AUTO: 0.07 K/UL — SIGNIFICANT CHANGE UP (ref 0–0.5)
EOSINOPHIL NFR BLD AUTO: 0.8 % — SIGNIFICANT CHANGE UP (ref 0–6)
EOSINOPHIL NFR BLD AUTO: 0.8 % — SIGNIFICANT CHANGE UP (ref 0–6)
GLUCOSE SERPL-MCNC: 107 MG/DL — HIGH (ref 70–99)
GLUCOSE SERPL-MCNC: 107 MG/DL — HIGH (ref 70–99)
HCT VFR BLD CALC: 39.3 % — SIGNIFICANT CHANGE UP (ref 39–50)
HCT VFR BLD CALC: 39.3 % — SIGNIFICANT CHANGE UP (ref 39–50)
HGB BLD-MCNC: 13 G/DL — SIGNIFICANT CHANGE UP (ref 13–17)
HGB BLD-MCNC: 13 G/DL — SIGNIFICANT CHANGE UP (ref 13–17)
IMM GRANULOCYTES NFR BLD AUTO: 0.6 % — SIGNIFICANT CHANGE UP (ref 0–0.9)
IMM GRANULOCYTES NFR BLD AUTO: 0.6 % — SIGNIFICANT CHANGE UP (ref 0–0.9)
LYMPHOCYTES # BLD AUTO: 1.24 K/UL — SIGNIFICANT CHANGE UP (ref 1–3.3)
LYMPHOCYTES # BLD AUTO: 1.24 K/UL — SIGNIFICANT CHANGE UP (ref 1–3.3)
LYMPHOCYTES # BLD AUTO: 14.4 % — SIGNIFICANT CHANGE UP (ref 13–44)
LYMPHOCYTES # BLD AUTO: 14.4 % — SIGNIFICANT CHANGE UP (ref 13–44)
MCHC RBC-ENTMCNC: 28.3 PG — SIGNIFICANT CHANGE UP (ref 27–34)
MCHC RBC-ENTMCNC: 28.3 PG — SIGNIFICANT CHANGE UP (ref 27–34)
MCHC RBC-ENTMCNC: 33.1 GM/DL — SIGNIFICANT CHANGE UP (ref 32–36)
MCHC RBC-ENTMCNC: 33.1 GM/DL — SIGNIFICANT CHANGE UP (ref 32–36)
MCV RBC AUTO: 85.4 FL — SIGNIFICANT CHANGE UP (ref 80–100)
MCV RBC AUTO: 85.4 FL — SIGNIFICANT CHANGE UP (ref 80–100)
MONOCYTES # BLD AUTO: 1.29 K/UL — HIGH (ref 0–0.9)
MONOCYTES # BLD AUTO: 1.29 K/UL — HIGH (ref 0–0.9)
MONOCYTES NFR BLD AUTO: 15 % — HIGH (ref 2–14)
MONOCYTES NFR BLD AUTO: 15 % — HIGH (ref 2–14)
NEUTROPHILS # BLD AUTO: 5.94 K/UL — SIGNIFICANT CHANGE UP (ref 1.8–7.4)
NEUTROPHILS # BLD AUTO: 5.94 K/UL — SIGNIFICANT CHANGE UP (ref 1.8–7.4)
NEUTROPHILS NFR BLD AUTO: 68.9 % — SIGNIFICANT CHANGE UP (ref 43–77)
NEUTROPHILS NFR BLD AUTO: 68.9 % — SIGNIFICANT CHANGE UP (ref 43–77)
PLATELET # BLD AUTO: 243 K/UL — SIGNIFICANT CHANGE UP (ref 150–400)
PLATELET # BLD AUTO: 243 K/UL — SIGNIFICANT CHANGE UP (ref 150–400)
POTASSIUM SERPL-MCNC: 3.7 MMOL/L — SIGNIFICANT CHANGE UP (ref 3.5–5.3)
POTASSIUM SERPL-MCNC: 3.7 MMOL/L — SIGNIFICANT CHANGE UP (ref 3.5–5.3)
POTASSIUM SERPL-SCNC: 3.7 MMOL/L — SIGNIFICANT CHANGE UP (ref 3.5–5.3)
POTASSIUM SERPL-SCNC: 3.7 MMOL/L — SIGNIFICANT CHANGE UP (ref 3.5–5.3)
RBC # BLD: 4.6 M/UL — SIGNIFICANT CHANGE UP (ref 4.2–5.8)
RBC # BLD: 4.6 M/UL — SIGNIFICANT CHANGE UP (ref 4.2–5.8)
RBC # FLD: 13.3 % — SIGNIFICANT CHANGE UP (ref 10.3–14.5)
RBC # FLD: 13.3 % — SIGNIFICANT CHANGE UP (ref 10.3–14.5)
SODIUM SERPL-SCNC: 138 MMOL/L — SIGNIFICANT CHANGE UP (ref 135–145)
SODIUM SERPL-SCNC: 138 MMOL/L — SIGNIFICANT CHANGE UP (ref 135–145)
SPECIMEN SOURCE: SIGNIFICANT CHANGE UP
SPECIMEN SOURCE: SIGNIFICANT CHANGE UP
WBC # BLD: 8.62 K/UL — SIGNIFICANT CHANGE UP (ref 3.8–10.5)
WBC # BLD: 8.62 K/UL — SIGNIFICANT CHANGE UP (ref 3.8–10.5)
WBC # FLD AUTO: 8.62 K/UL — SIGNIFICANT CHANGE UP (ref 3.8–10.5)
WBC # FLD AUTO: 8.62 K/UL — SIGNIFICANT CHANGE UP (ref 3.8–10.5)

## 2023-10-23 PROCEDURE — 93010 ELECTROCARDIOGRAM REPORT: CPT

## 2023-10-23 PROCEDURE — 99221 1ST HOSP IP/OBS SF/LOW 40: CPT

## 2023-10-23 RX ADMIN — TELMISARTAN 40 MILLIGRAM(S): 20 TABLET ORAL at 10:18

## 2023-10-23 RX ADMIN — DEXTROSE MONOHYDRATE, SODIUM CHLORIDE, AND POTASSIUM CHLORIDE 125 MILLILITER(S): 50; .745; 4.5 INJECTION, SOLUTION INTRAVENOUS at 12:39

## 2023-10-23 RX ADMIN — Medication 80 MILLIGRAM(S): at 10:17

## 2023-10-23 RX ADMIN — PIPERACILLIN AND TAZOBACTAM 25 GRAM(S): 4; .5 INJECTION, POWDER, LYOPHILIZED, FOR SOLUTION INTRAVENOUS at 18:06

## 2023-10-23 RX ADMIN — HEPARIN SODIUM 5000 UNIT(S): 5000 INJECTION INTRAVENOUS; SUBCUTANEOUS at 21:47

## 2023-10-23 RX ADMIN — AMLODIPINE BESYLATE 5 MILLIGRAM(S): 2.5 TABLET ORAL at 10:17

## 2023-10-23 RX ADMIN — HEPARIN SODIUM 5000 UNIT(S): 5000 INJECTION INTRAVENOUS; SUBCUTANEOUS at 14:17

## 2023-10-23 RX ADMIN — PIPERACILLIN AND TAZOBACTAM 25 GRAM(S): 4; .5 INJECTION, POWDER, LYOPHILIZED, FOR SOLUTION INTRAVENOUS at 10:17

## 2023-10-23 RX ADMIN — HEPARIN SODIUM 5000 UNIT(S): 5000 INJECTION INTRAVENOUS; SUBCUTANEOUS at 05:46

## 2023-10-23 RX ADMIN — Medication 25 MILLIGRAM(S): at 10:17

## 2023-10-24 LAB
ANION GAP SERPL CALC-SCNC: 4 MMOL/L — LOW (ref 5–17)
ANION GAP SERPL CALC-SCNC: 4 MMOL/L — LOW (ref 5–17)
BASOPHILS # BLD AUTO: 0.03 K/UL — SIGNIFICANT CHANGE UP (ref 0–0.2)
BASOPHILS # BLD AUTO: 0.03 K/UL — SIGNIFICANT CHANGE UP (ref 0–0.2)
BASOPHILS NFR BLD AUTO: 0.4 % — SIGNIFICANT CHANGE UP (ref 0–2)
BASOPHILS NFR BLD AUTO: 0.4 % — SIGNIFICANT CHANGE UP (ref 0–2)
BUN SERPL-MCNC: 5 MG/DL — LOW (ref 7–23)
BUN SERPL-MCNC: 5 MG/DL — LOW (ref 7–23)
CALCIUM SERPL-MCNC: 8.7 MG/DL — SIGNIFICANT CHANGE UP (ref 8.5–10.1)
CALCIUM SERPL-MCNC: 8.7 MG/DL — SIGNIFICANT CHANGE UP (ref 8.5–10.1)
CHLORIDE SERPL-SCNC: 108 MMOL/L — SIGNIFICANT CHANGE UP (ref 96–108)
CHLORIDE SERPL-SCNC: 108 MMOL/L — SIGNIFICANT CHANGE UP (ref 96–108)
CO2 SERPL-SCNC: 28 MMOL/L — SIGNIFICANT CHANGE UP (ref 22–31)
CO2 SERPL-SCNC: 28 MMOL/L — SIGNIFICANT CHANGE UP (ref 22–31)
CREAT SERPL-MCNC: 0.72 MG/DL — SIGNIFICANT CHANGE UP (ref 0.5–1.3)
CREAT SERPL-MCNC: 0.72 MG/DL — SIGNIFICANT CHANGE UP (ref 0.5–1.3)
EGFR: 114 ML/MIN/1.73M2 — SIGNIFICANT CHANGE UP
EGFR: 114 ML/MIN/1.73M2 — SIGNIFICANT CHANGE UP
EOSINOPHIL # BLD AUTO: 0.12 K/UL — SIGNIFICANT CHANGE UP (ref 0–0.5)
EOSINOPHIL # BLD AUTO: 0.12 K/UL — SIGNIFICANT CHANGE UP (ref 0–0.5)
EOSINOPHIL NFR BLD AUTO: 1.7 % — SIGNIFICANT CHANGE UP (ref 0–6)
EOSINOPHIL NFR BLD AUTO: 1.7 % — SIGNIFICANT CHANGE UP (ref 0–6)
GLUCOSE SERPL-MCNC: 97 MG/DL — SIGNIFICANT CHANGE UP (ref 70–99)
GLUCOSE SERPL-MCNC: 97 MG/DL — SIGNIFICANT CHANGE UP (ref 70–99)
HCT VFR BLD CALC: 39.8 % — SIGNIFICANT CHANGE UP (ref 39–50)
HCT VFR BLD CALC: 39.8 % — SIGNIFICANT CHANGE UP (ref 39–50)
HGB BLD-MCNC: 13.1 G/DL — SIGNIFICANT CHANGE UP (ref 13–17)
HGB BLD-MCNC: 13.1 G/DL — SIGNIFICANT CHANGE UP (ref 13–17)
IMM GRANULOCYTES NFR BLD AUTO: 0.4 % — SIGNIFICANT CHANGE UP (ref 0–0.9)
IMM GRANULOCYTES NFR BLD AUTO: 0.4 % — SIGNIFICANT CHANGE UP (ref 0–0.9)
LYMPHOCYTES # BLD AUTO: 1.08 K/UL — SIGNIFICANT CHANGE UP (ref 1–3.3)
LYMPHOCYTES # BLD AUTO: 1.08 K/UL — SIGNIFICANT CHANGE UP (ref 1–3.3)
LYMPHOCYTES # BLD AUTO: 15.1 % — SIGNIFICANT CHANGE UP (ref 13–44)
LYMPHOCYTES # BLD AUTO: 15.1 % — SIGNIFICANT CHANGE UP (ref 13–44)
MCHC RBC-ENTMCNC: 28.1 PG — SIGNIFICANT CHANGE UP (ref 27–34)
MCHC RBC-ENTMCNC: 28.1 PG — SIGNIFICANT CHANGE UP (ref 27–34)
MCHC RBC-ENTMCNC: 32.9 GM/DL — SIGNIFICANT CHANGE UP (ref 32–36)
MCHC RBC-ENTMCNC: 32.9 GM/DL — SIGNIFICANT CHANGE UP (ref 32–36)
MCV RBC AUTO: 85.4 FL — SIGNIFICANT CHANGE UP (ref 80–100)
MCV RBC AUTO: 85.4 FL — SIGNIFICANT CHANGE UP (ref 80–100)
MONOCYTES # BLD AUTO: 0.99 K/UL — HIGH (ref 0–0.9)
MONOCYTES # BLD AUTO: 0.99 K/UL — HIGH (ref 0–0.9)
MONOCYTES NFR BLD AUTO: 13.9 % — SIGNIFICANT CHANGE UP (ref 2–14)
MONOCYTES NFR BLD AUTO: 13.9 % — SIGNIFICANT CHANGE UP (ref 2–14)
NEUTROPHILS # BLD AUTO: 4.88 K/UL — SIGNIFICANT CHANGE UP (ref 1.8–7.4)
NEUTROPHILS # BLD AUTO: 4.88 K/UL — SIGNIFICANT CHANGE UP (ref 1.8–7.4)
NEUTROPHILS NFR BLD AUTO: 68.5 % — SIGNIFICANT CHANGE UP (ref 43–77)
NEUTROPHILS NFR BLD AUTO: 68.5 % — SIGNIFICANT CHANGE UP (ref 43–77)
PLATELET # BLD AUTO: 258 K/UL — SIGNIFICANT CHANGE UP (ref 150–400)
PLATELET # BLD AUTO: 258 K/UL — SIGNIFICANT CHANGE UP (ref 150–400)
POTASSIUM SERPL-MCNC: 3.7 MMOL/L — SIGNIFICANT CHANGE UP (ref 3.5–5.3)
POTASSIUM SERPL-MCNC: 3.7 MMOL/L — SIGNIFICANT CHANGE UP (ref 3.5–5.3)
POTASSIUM SERPL-SCNC: 3.7 MMOL/L — SIGNIFICANT CHANGE UP (ref 3.5–5.3)
POTASSIUM SERPL-SCNC: 3.7 MMOL/L — SIGNIFICANT CHANGE UP (ref 3.5–5.3)
RBC # BLD: 4.66 M/UL — SIGNIFICANT CHANGE UP (ref 4.2–5.8)
RBC # BLD: 4.66 M/UL — SIGNIFICANT CHANGE UP (ref 4.2–5.8)
RBC # FLD: 13 % — SIGNIFICANT CHANGE UP (ref 10.3–14.5)
RBC # FLD: 13 % — SIGNIFICANT CHANGE UP (ref 10.3–14.5)
SODIUM SERPL-SCNC: 140 MMOL/L — SIGNIFICANT CHANGE UP (ref 135–145)
SODIUM SERPL-SCNC: 140 MMOL/L — SIGNIFICANT CHANGE UP (ref 135–145)
WBC # BLD: 7.13 K/UL — SIGNIFICANT CHANGE UP (ref 3.8–10.5)
WBC # BLD: 7.13 K/UL — SIGNIFICANT CHANGE UP (ref 3.8–10.5)
WBC # FLD AUTO: 7.13 K/UL — SIGNIFICANT CHANGE UP (ref 3.8–10.5)
WBC # FLD AUTO: 7.13 K/UL — SIGNIFICANT CHANGE UP (ref 3.8–10.5)

## 2023-10-24 PROCEDURE — 99232 SBSQ HOSP IP/OBS MODERATE 35: CPT

## 2023-10-24 RX ADMIN — AMLODIPINE BESYLATE 5 MILLIGRAM(S): 2.5 TABLET ORAL at 09:30

## 2023-10-24 RX ADMIN — Medication 400 MILLIGRAM(S): at 01:23

## 2023-10-24 RX ADMIN — HEPARIN SODIUM 5000 UNIT(S): 5000 INJECTION INTRAVENOUS; SUBCUTANEOUS at 14:43

## 2023-10-24 RX ADMIN — PIPERACILLIN AND TAZOBACTAM 25 GRAM(S): 4; .5 INJECTION, POWDER, LYOPHILIZED, FOR SOLUTION INTRAVENOUS at 01:09

## 2023-10-24 RX ADMIN — HEPARIN SODIUM 5000 UNIT(S): 5000 INJECTION INTRAVENOUS; SUBCUTANEOUS at 05:39

## 2023-10-24 RX ADMIN — TELMISARTAN 40 MILLIGRAM(S): 20 TABLET ORAL at 09:31

## 2023-10-24 RX ADMIN — Medication 80 MILLIGRAM(S): at 09:31

## 2023-10-24 RX ADMIN — PIPERACILLIN AND TAZOBACTAM 25 GRAM(S): 4; .5 INJECTION, POWDER, LYOPHILIZED, FOR SOLUTION INTRAVENOUS at 09:30

## 2023-10-24 RX ADMIN — Medication 1000 MILLIGRAM(S): at 01:53

## 2023-10-24 RX ADMIN — DEXTROSE MONOHYDRATE, SODIUM CHLORIDE, AND POTASSIUM CHLORIDE 125 MILLILITER(S): 50; .745; 4.5 INJECTION, SOLUTION INTRAVENOUS at 14:43

## 2023-10-24 RX ADMIN — PIPERACILLIN AND TAZOBACTAM 25 GRAM(S): 4; .5 INJECTION, POWDER, LYOPHILIZED, FOR SOLUTION INTRAVENOUS at 17:52

## 2023-10-24 RX ADMIN — HEPARIN SODIUM 5000 UNIT(S): 5000 INJECTION INTRAVENOUS; SUBCUTANEOUS at 21:24

## 2023-10-25 LAB
ANION GAP SERPL CALC-SCNC: 5 MMOL/L — SIGNIFICANT CHANGE UP (ref 5–17)
ANION GAP SERPL CALC-SCNC: 5 MMOL/L — SIGNIFICANT CHANGE UP (ref 5–17)
BASOPHILS # BLD AUTO: 0.03 K/UL — SIGNIFICANT CHANGE UP (ref 0–0.2)
BASOPHILS # BLD AUTO: 0.03 K/UL — SIGNIFICANT CHANGE UP (ref 0–0.2)
BASOPHILS NFR BLD AUTO: 0.4 % — SIGNIFICANT CHANGE UP (ref 0–2)
BASOPHILS NFR BLD AUTO: 0.4 % — SIGNIFICANT CHANGE UP (ref 0–2)
BUN SERPL-MCNC: 4 MG/DL — LOW (ref 7–23)
BUN SERPL-MCNC: 4 MG/DL — LOW (ref 7–23)
CALCIUM SERPL-MCNC: 8.4 MG/DL — LOW (ref 8.5–10.1)
CALCIUM SERPL-MCNC: 8.4 MG/DL — LOW (ref 8.5–10.1)
CHLORIDE SERPL-SCNC: 105 MMOL/L — SIGNIFICANT CHANGE UP (ref 96–108)
CHLORIDE SERPL-SCNC: 105 MMOL/L — SIGNIFICANT CHANGE UP (ref 96–108)
CO2 SERPL-SCNC: 27 MMOL/L — SIGNIFICANT CHANGE UP (ref 22–31)
CO2 SERPL-SCNC: 27 MMOL/L — SIGNIFICANT CHANGE UP (ref 22–31)
CREAT SERPL-MCNC: 0.78 MG/DL — SIGNIFICANT CHANGE UP (ref 0.5–1.3)
CREAT SERPL-MCNC: 0.78 MG/DL — SIGNIFICANT CHANGE UP (ref 0.5–1.3)
EGFR: 111 ML/MIN/1.73M2 — SIGNIFICANT CHANGE UP
EGFR: 111 ML/MIN/1.73M2 — SIGNIFICANT CHANGE UP
EOSINOPHIL # BLD AUTO: 0.11 K/UL — SIGNIFICANT CHANGE UP (ref 0–0.5)
EOSINOPHIL # BLD AUTO: 0.11 K/UL — SIGNIFICANT CHANGE UP (ref 0–0.5)
EOSINOPHIL NFR BLD AUTO: 1.3 % — SIGNIFICANT CHANGE UP (ref 0–6)
EOSINOPHIL NFR BLD AUTO: 1.3 % — SIGNIFICANT CHANGE UP (ref 0–6)
GLUCOSE SERPL-MCNC: 103 MG/DL — HIGH (ref 70–99)
GLUCOSE SERPL-MCNC: 103 MG/DL — HIGH (ref 70–99)
HCT VFR BLD CALC: 37.4 % — LOW (ref 39–50)
HCT VFR BLD CALC: 37.4 % — LOW (ref 39–50)
HGB BLD-MCNC: 12.5 G/DL — LOW (ref 13–17)
HGB BLD-MCNC: 12.5 G/DL — LOW (ref 13–17)
IMM GRANULOCYTES NFR BLD AUTO: 0.5 % — SIGNIFICANT CHANGE UP (ref 0–0.9)
IMM GRANULOCYTES NFR BLD AUTO: 0.5 % — SIGNIFICANT CHANGE UP (ref 0–0.9)
LYMPHOCYTES # BLD AUTO: 1.18 K/UL — SIGNIFICANT CHANGE UP (ref 1–3.3)
LYMPHOCYTES # BLD AUTO: 1.18 K/UL — SIGNIFICANT CHANGE UP (ref 1–3.3)
LYMPHOCYTES # BLD AUTO: 14.4 % — SIGNIFICANT CHANGE UP (ref 13–44)
LYMPHOCYTES # BLD AUTO: 14.4 % — SIGNIFICANT CHANGE UP (ref 13–44)
MCHC RBC-ENTMCNC: 28.2 PG — SIGNIFICANT CHANGE UP (ref 27–34)
MCHC RBC-ENTMCNC: 28.2 PG — SIGNIFICANT CHANGE UP (ref 27–34)
MCHC RBC-ENTMCNC: 33.4 GM/DL — SIGNIFICANT CHANGE UP (ref 32–36)
MCHC RBC-ENTMCNC: 33.4 GM/DL — SIGNIFICANT CHANGE UP (ref 32–36)
MCV RBC AUTO: 84.4 FL — SIGNIFICANT CHANGE UP (ref 80–100)
MCV RBC AUTO: 84.4 FL — SIGNIFICANT CHANGE UP (ref 80–100)
MONOCYTES # BLD AUTO: 1.19 K/UL — HIGH (ref 0–0.9)
MONOCYTES # BLD AUTO: 1.19 K/UL — HIGH (ref 0–0.9)
MONOCYTES NFR BLD AUTO: 14.5 % — HIGH (ref 2–14)
MONOCYTES NFR BLD AUTO: 14.5 % — HIGH (ref 2–14)
NEUTROPHILS # BLD AUTO: 5.64 K/UL — SIGNIFICANT CHANGE UP (ref 1.8–7.4)
NEUTROPHILS # BLD AUTO: 5.64 K/UL — SIGNIFICANT CHANGE UP (ref 1.8–7.4)
NEUTROPHILS NFR BLD AUTO: 68.9 % — SIGNIFICANT CHANGE UP (ref 43–77)
NEUTROPHILS NFR BLD AUTO: 68.9 % — SIGNIFICANT CHANGE UP (ref 43–77)
PLATELET # BLD AUTO: 255 K/UL — SIGNIFICANT CHANGE UP (ref 150–400)
PLATELET # BLD AUTO: 255 K/UL — SIGNIFICANT CHANGE UP (ref 150–400)
POTASSIUM SERPL-MCNC: 3.8 MMOL/L — SIGNIFICANT CHANGE UP (ref 3.5–5.3)
POTASSIUM SERPL-MCNC: 3.8 MMOL/L — SIGNIFICANT CHANGE UP (ref 3.5–5.3)
POTASSIUM SERPL-SCNC: 3.8 MMOL/L — SIGNIFICANT CHANGE UP (ref 3.5–5.3)
POTASSIUM SERPL-SCNC: 3.8 MMOL/L — SIGNIFICANT CHANGE UP (ref 3.5–5.3)
RBC # BLD: 4.43 M/UL — SIGNIFICANT CHANGE UP (ref 4.2–5.8)
RBC # BLD: 4.43 M/UL — SIGNIFICANT CHANGE UP (ref 4.2–5.8)
RBC # FLD: 12.7 % — SIGNIFICANT CHANGE UP (ref 10.3–14.5)
RBC # FLD: 12.7 % — SIGNIFICANT CHANGE UP (ref 10.3–14.5)
SODIUM SERPL-SCNC: 137 MMOL/L — SIGNIFICANT CHANGE UP (ref 135–145)
SODIUM SERPL-SCNC: 137 MMOL/L — SIGNIFICANT CHANGE UP (ref 135–145)
WBC # BLD: 8.19 K/UL — SIGNIFICANT CHANGE UP (ref 3.8–10.5)
WBC # BLD: 8.19 K/UL — SIGNIFICANT CHANGE UP (ref 3.8–10.5)
WBC # FLD AUTO: 8.19 K/UL — SIGNIFICANT CHANGE UP (ref 3.8–10.5)
WBC # FLD AUTO: 8.19 K/UL — SIGNIFICANT CHANGE UP (ref 3.8–10.5)

## 2023-10-25 PROCEDURE — 99232 SBSQ HOSP IP/OBS MODERATE 35: CPT

## 2023-10-25 RX ADMIN — DEXTROSE MONOHYDRATE, SODIUM CHLORIDE, AND POTASSIUM CHLORIDE 125 MILLILITER(S): 50; .745; 4.5 INJECTION, SOLUTION INTRAVENOUS at 16:39

## 2023-10-25 RX ADMIN — PIPERACILLIN AND TAZOBACTAM 25 GRAM(S): 4; .5 INJECTION, POWDER, LYOPHILIZED, FOR SOLUTION INTRAVENOUS at 10:14

## 2023-10-25 RX ADMIN — HEPARIN SODIUM 5000 UNIT(S): 5000 INJECTION INTRAVENOUS; SUBCUTANEOUS at 15:20

## 2023-10-25 RX ADMIN — AMLODIPINE BESYLATE 5 MILLIGRAM(S): 2.5 TABLET ORAL at 10:13

## 2023-10-25 RX ADMIN — Medication 80 MILLIGRAM(S): at 10:15

## 2023-10-25 RX ADMIN — PIPERACILLIN AND TAZOBACTAM 25 GRAM(S): 4; .5 INJECTION, POWDER, LYOPHILIZED, FOR SOLUTION INTRAVENOUS at 18:13

## 2023-10-25 RX ADMIN — HEPARIN SODIUM 5000 UNIT(S): 5000 INJECTION INTRAVENOUS; SUBCUTANEOUS at 05:15

## 2023-10-25 RX ADMIN — TELMISARTAN 40 MILLIGRAM(S): 20 TABLET ORAL at 10:16

## 2023-10-25 RX ADMIN — PIPERACILLIN AND TAZOBACTAM 25 GRAM(S): 4; .5 INJECTION, POWDER, LYOPHILIZED, FOR SOLUTION INTRAVENOUS at 01:27

## 2023-10-25 RX ADMIN — HEPARIN SODIUM 5000 UNIT(S): 5000 INJECTION INTRAVENOUS; SUBCUTANEOUS at 21:02

## 2023-10-25 NOTE — CONSULT NOTE ADULT - SUBJECTIVE AND OBJECTIVE BOX
CC-  abdominal pain (23 Oct 2023 10:31)    HPI:  47yo/M with PMH HTN presented with few days of LLQ pain. no fever. No n/v. +diarrhea. Pain comes and goes and is very sharp, radiates to bladder area. In ED found to have sigmoid diverticulitis with microperforation and admitted to surg service. Hospitalist consulted for preop medical clearance. Patient denies known cardiac issues, no stents, no prior stress test. Denies chest pain or dyspnea on exertion or rest. Not on AC    PMH- as above  PSH- cholecystectomy  Soc hx- denies smoking, no alcohol  Fam hx M breast ca, F CLL    10/23/23- has been NPO since admission, pain has subsided and feels better.    Review of system- All 10 systems reviewed and is as per HPI otherwise negative.     T(C): 36.8 (10-23-23 @ 08:00), Max: 37.4 (10-22-23 @ 17:40)  HR: 65 (10-23-23 @ 08:00) (60 - 71)  BP: 122/66 (10-23-23 @ 08:00) (122/66 - 133/84)  RR: 17 (10-23-23 @ 08:00) (17 - 18)  SpO2: 95% (10-23-23 @ 08:00) (95% - 98%)  Wt(kg): --    LABS:                        13.0   8.62  )-----------( 243      ( 23 Oct 2023 08:16 )             39.3     10-23    138  |  106  |  10  ----------------------------<  107<H>  3.7   |  28  |  0.80    Ca    9.0      23 Oct 2023 08:16    TPro  7.7  /  Alb  3.7  /  TBili  0.9  /  DBili  x   /  AST  15  /  ALT  62  /  AlkPhos  88  10-22    Urinalysis Basic - ( 23 Oct 2023 08:16 )  Color: x / Appearance: x / SG: x / pH: x  Gluc: 107 mg/dL / Ketone: x  / Bili: x / Urobili: x   Blood: x / Protein: x / Nitrite: x   Leuk Esterase: x / RBC: x / WBC x   Sq Epi: x / Non Sq Epi: x / Bacteria: x      RADIOLOGY & ADDITIONAL TESTS:  ACC: [id number] EXAM:  CT ABDOMEN AND PELVIS   ORDERED BY: MAURO VALE   PROCEDURE DATE:  10/22/2023      INTERPRETATION:  CLINICAL INFORMATION: Left flank pain, diarrhea    COMPARISON: None.    CONTRAST/COMPLICATIONS:  IV Contrast: None  Oral Contrast: None  Complications: None reported    PROCEDURE:  CT of the Abdomen and Pelvis was performed in the prone position.  Sagittal and coronal reformats were performed.    FINDINGS:    LOWER CHEST: No visualized pleural effusion. Few sub-4 mm pulmonary   nodules. For reference, 2 mm left lower lobe nodule, image 8 series 2,   and 3 mm left lower lobe nodule image 15 series 2.    Solid organ evaluation limited due to noncontrast technique.    LIVER: Enlarged, 20 cm craniocaudal dimension with diffuse steatosis.   Partially recanalized paraumbilical vein  BILE DUCTS: No distention  GALLBLADDER: Cholecystectomy.  SPLEEN: Spleen size within normal limits  PANCREAS: No acute peripancreatic inflammation  ADRENALS: Unremarkable  KIDNEYS/URETERS: No hydronephrosis or obstructing ureteral calculus. 2 mm   nonobstructing left renal calculus. Left renal cyst. Additional small   renal hypodensities are too small to characterize. Nonspecific bilateral   perinephric stranding.    BLADDER: Minimally distended.  REPRODUCTIVE ORGANS: Prostate size is within normal limits.    BOWEL and PERITONEUM: Limited noncontrast evaluation. Stomach is under   distended. No small bowel distention. Appendix is unremarkable. Mild   stool burden of the colon limits evaluation of the colonic mucosa.   Colonic diverticulosis with acute diverticulitis of the mid sigmoid   colon. Trace adjacent droplets of extraluminal air and 2.2 cm perisigmoid   air/fluid on image 39 series 602, indicating localized perforation. No   drainable fluid collection. No distant free air.  PERITONEUM: See above 'bowel and peritoneum' section.  VESSELS: No abdominal aortic aneurysm. Atheromatous changes.  RETROPERITONEUM/LYMPH NODES: Small volume nodes.  ABDOMINAL WALL: Small fat-containing right inguinal hernia.  BONES: Degenerative changes.    IMPRESSION:    Limited noncontrast study.    Acute mid sigmoid colon diverticulitis. Trace adjacent droplets of   extraluminal air and 2.2 cm perisigmoid air/fluid on image 39 series 602,   indicating localized perforation. No drainable fluid collection. No   distant free air. Follow to resolution. Recommend colonoscopy once acute   symptoms have resolved.    No hydronephrosis or obstructing ureteral calculus. 2 mm nonobstructing   left renal calculus.    Sub-4 mm pulmonary nodules can be followed with dedicated chest CT in 12   months, or as per patient risk factors.      PHYSICAL EXAM:  GENERAL: NAD, well-groomed, well-developed  HEAD:  Atraumatic, Normocephalic  EYES: EOMI, PERRLA, conjunctiva and sclera clear  HEENT: Moist mucous membranes  NECK: Supple, No JVD  NERVOUS SYSTEM:  Alert & Oriented X3, Motor Strength 5/5 B/L upper and lower extremities; DTRs 2+ intact and symmetric  CHEST/LUNG: Clear to auscultation bilaterally; No rales, rhonchi, wheezing, or rubs  HEART: Regular rate and rhythm; No murmurs, rubs, or gallops  ABDOMEN: Soft, Nontender, Nondistended; Bowel sounds present  GENITOURINARY- Voiding, no palpable bladder  EXTREMITIES:  2+ Peripheral Pulses, No clubbing, cyanosis, or edema  MUSCULOSKELTAL- No muscle tenderness, Muscle tone normal, No joint tenderness, no Joint swelling, Joint range of motion-normal  SKIN-no rash, no lesion  CNS- alert, oriented X3, non focal     MEDICATIONS  (STANDING):  acetaminophen   IVPB .. 1000 milliGRAM(s) IV Intermittent once  amLODIPine   Tablet 5 milliGRAM(s) Oral daily  chlorthalidone 25 milliGRAM(s) Oral daily  dextrose 5% + sodium chloride 0.9% with potassium chloride 20 mEq/L 1000 milliLiter(s) (125 mL/Hr) IV Continuous <Continuous>  heparin   Injectable 5000 Unit(s) SubCutaneous every 8 hours  influenza   Vaccine 0.5 milliLiter(s) IntraMuscular once  losartan 50 milliGRAM(s) Oral daily  piperacillin/tazobactam IVPB.. 3.375 Gram(s) IV Intermittent every 8 hours  propranolol LA 80 milliGRAM(s) Oral daily  telmisartan 40 milliGRAM(s) Oral daily    MEDICATIONS  (PRN):  ondansetron Injectable 4 milliGRAM(s) IV Push every 6 hours PRN Nausea and/or Vomiting    Assessment/Plan  #Acute sigmoid diverticulitis with contained perforation  Surg team following  imaging studies reviewed  NPO  IVF  Pain meds prn  IV abx  Should the patient require surgery- no medical contraindications for OR    #HTN  Cont norvasc, arb  hold diuretic while npo    #DVT proph- heparin sq    #Dispo- thank you for consult, will follow    
Patient is a 48y old  Male who presents with a chief complaint of abdominal pain     HPI:  49 y/o Male with h/o HTN, migraine, cholecystectomy was admitted on 10/22 for LLQ pain x several days, but no fever. No n/v., but has diarrhea. Pain comes and goes and is very sharp, radiates to bladder area. Went to  and sent to ED for further eval. In ER he received zosyn.     PMH: as above  PSH: as above  Meds: per reconciliation sheet, noted below  MEDICATIONS  (STANDING):  amLODIPine   Tablet 5 milliGRAM(s) Oral daily  dextrose 5% + sodium chloride 0.9% with potassium chloride 20 mEq/L 1000 milliLiter(s) (125 mL/Hr) IV Continuous <Continuous>  heparin   Injectable 5000 Unit(s) SubCutaneous every 8 hours  influenza   Vaccine 0.5 milliLiter(s) IntraMuscular once  piperacillin/tazobactam IVPB.. 3.375 Gram(s) IV Intermittent every 8 hours  propranolol LA 80 milliGRAM(s) Oral daily  telmisartan 40 milliGRAM(s) Oral daily    MEDICATIONS  (PRN):  ondansetron Injectable 4 milliGRAM(s) IV Push every 6 hours PRN Nausea and/or Vomiting    Allergies    No Known Allergies    Intolerances      Social: no smoking, no alcohol, no illegal drugs; no recent travel, no exposure to TB  FAMILY HISTORY:    no history of premature cardiovascular disease in first degree relatives    ROS: the patient denies fever, no chills, no HA, no seizures, no dizziness, no sore throat, no nasal congestion, no blurry vision, no CP, no palpitations, no SOB, no cough, has abdominal pain, has diarrhea, no N/V, no dysuria, no leg pain, no claudication, no rash, no joint aches, no rectal pain or bleeding, no night sweats  All other systems reviewed and are negative    Vital Signs Last 24 Hrs  T(C): 37.6 (25 Oct 2023 07:57), Max: 37.6 (25 Oct 2023 07:57)  T(F): 99.6 (25 Oct 2023 07:57), Max: 99.6 (25 Oct 2023 07:57)  HR: 64 (25 Oct 2023 07:57) (62 - 65)  BP: 131/79 (25 Oct 2023 07:57) (126/81 - 131/79)  BP(mean): --  RR: 16 (25 Oct 2023 07:57) (16 - 18)  SpO2: 98% (25 Oct 2023 07:57) (94% - 98%)    Parameters below as of 25 Oct 2023 07:57  Patient On (Oxygen Delivery Method): room air    PE:    Constitutional:  No acute distress  HEENT: NC/AT, EOMI, PERRLA, conjunctivae clear; ears and nose atraumatic; pharynx benign  Neck: supple; thyroid not palpable  Back: no tenderness  Respiratory: respiratory effort normal; clear to auscultation  Cardiovascular: S1S2 regular, no murmurs  Abdomen: soft, lower abdomen tender, not distended, positive BS; no liver or spleen organomegaly  Genitourinary: no suprapubic tenderness  Lymphatic: no LN palpable  Musculoskeletal: no muscle tenderness, no joint swelling or tenderness  Extremities: no pedal edema  Neurological/ Psychiatric: AxOx3, judgement and insight normal; moving all extremities  Skin: no rashes; no palpable lesions    Labs: all available labs reviewed                        12.5   8.19  )-----------( 255      ( 25 Oct 2023 08:13 )             37.4     10-24    140  |  108  |  5<L>  ----------------------------<  97  3.7   |  28  |  0.72    Ca    8.7      24 Oct 2023 08:19    Culture - Urine (collected 22 Oct 2023 17:51)  Source: Clean Catch None  Final Report (23 Oct 2023 16:38):    <10,000 CFU/mL Normal Urogenital Becka    Radiology: all available radiological tests reviewed    < from: CT Abdomen and Pelvis No Cont (10.22.23 @ 18:47) >  Acute mid sigmoid colon diverticulitis. Trace adjacent droplets of   extraluminal air and 2.2 cm perisigmoid air/fluid on image 39 series 602,   indicating localized perforation. No drainable fluid collection. No   distant free air. Follow to resolution. Recommend colonoscopy once acute symptoms have resolved.    No hydronephrosis or obstructing ureteral calculus. 2 mm nonobstructing left renal calculus.    Sub-4 mm pulmonary nodules can be followed with dedicated chest CT in 12 months, or as per patient risk factors.  < end of copied text >    Advanced directives addressed: full resuscitation
HPI:  ED 47 yo M with pmhx htn migraine, cholecystectomy with c/o few days of LLQ pain. no fever. No n/v. +diarrhea. Pain comes and goes and is very sharp, radiates to bladder area. No testicle pain or back pain. No urine symptoms. No hx of kidney stone or diverticulitis. Went to  and sent to ED for further eval. (22 Oct 2023 20:26)  -----------  Feels a little better now   Last colonoscopy 11/2022 showed diverticulosis and a tubulovillous adenoma      PAST MEDICAL & SURGICAL HISTORY:  Migraine      Hypertension      CHRISTINA (obstructive sleep apnea)      Deviated septum      S/P cholecystectomy  2004      H/O vasectomy  2009      History of surgery  retrieval of sperm after vasectomy          Home Medications:  amLODIPine 5 mg oral tablet: 1 tab(s) orally once a day (22 Oct 2023 21:17)  azelastine 137 mcg/inh (0.1%) nasal spray: 2 spray(s) intranasally once a day as needed for (22 Oct 2023 21:19)  chlorthalidone 25 mg oral tablet: 1 tab(s) orally once a day (22 Oct 2023 21:17)  cholecalciferol 25 mcg (1000 intl units) oral tablet: 1 tab(s) orally once a day (22 Oct 2023 21:18)  Flonase 50 mcg/inh nasal spray: 1 spray(s) in each nostril once a day as needed for (22 Oct 2023 21:19)  propranoloL 80 mg oral capsule, extended release: 1 cap(s) orally once a day (22 Oct 2023 21:17)  telmisartan 40 mg oral tablet: 1 tab(s) orally once a day (22 Oct 2023 21:19)  Tums 500 mg oral tablet, chewable: 1 tab(s) chewed 2 times a day as needed for (22 Oct 2023 21:19)      MEDICATIONS  (STANDING):  acetaminophen   IVPB .. 1000 milliGRAM(s) IV Intermittent once  amLODIPine   Tablet 5 milliGRAM(s) Oral daily  chlorthalidone 25 milliGRAM(s) Oral daily  dextrose 5% + sodium chloride 0.9% with potassium chloride 20 mEq/L 1000 milliLiter(s) (125 mL/Hr) IV Continuous <Continuous>  heparin   Injectable 5000 Unit(s) SubCutaneous every 8 hours  influenza   Vaccine 0.5 milliLiter(s) IntraMuscular once  losartan 50 milliGRAM(s) Oral daily  piperacillin/tazobactam IVPB.. 3.375 Gram(s) IV Intermittent every 8 hours  propranolol LA 80 milliGRAM(s) Oral daily  telmisartan 40 milliGRAM(s) Oral daily    MEDICATIONS  (PRN):  ondansetron Injectable 4 milliGRAM(s) IV Push every 6 hours PRN Nausea and/or Vomiting      Allergies    No Known Allergies    Intolerances        SOCIAL HISTORY:    FAMILY HISTORY:      ROS  As above  Otherwise unremarkable    Vital Signs Last 24 Hrs  T(C): 36.8 (23 Oct 2023 08:00), Max: 37.4 (22 Oct 2023 17:40)  T(F): 98.2 (23 Oct 2023 08:00), Max: 99.3 (22 Oct 2023 17:40)  HR: 65 (23 Oct 2023 08:00) (60 - 71)  BP: 122/66 (23 Oct 2023 08:00) (122/66 - 133/84)  BP(mean): 93 (22 Oct 2023 17:40) (93 - 93)  RR: 17 (23 Oct 2023 08:00) (17 - 18)  SpO2: 95% (23 Oct 2023 08:00) (95% - 98%)    Parameters below as of 23 Oct 2023 08:00  Patient On (Oxygen Delivery Method): room air        Constitutional: NAD, well-developed  Respiratory: CTAB  Cardiovascular: S1 and S2, RRR  Gastrointestinal: BS+, soft, mod LLQ tenderness  Extremities: No peripheral edema  Psychiatric: Normal mood, normal affect  Skin: No rashes    LABS:                        13.0   8.62  )-----------( 243      ( 23 Oct 2023 08:16 )             39.3     10-23    138  |  106  |  10  ----------------------------<  107<H>  3.7   |  28  |  0.80    Ca    9.0      23 Oct 2023 08:16    TPro  7.7  /  Alb  3.7  /  TBili  0.9  /  DBili  x   /  AST  15  /  ALT  62  /  AlkPhos  88  10-22      LIVER FUNCTIONS - ( 22 Oct 2023 17:51 )  Alb: 3.7 g/dL / Pro: 7.7 gm/dL / ALK PHOS: 88 U/L / ALT: 62 U/L / AST: 15 U/L / GGT: x             RADIOLOGY & ADDITIONAL STUDIES:

## 2023-10-25 NOTE — CONSULT NOTE ADULT - ASSESSMENT
47 y/o Male with h/o HTN, migraine, cholecystectomy was admitted on 10/22 for LLQ pain x several days, but no fever. No n/v., but has diarrhea. Pain comes and goes and is very sharp, radiates to bladder area. Went to UC and sent to ED for further eval. In ER he received zosyn.     1. Acute sigmoid diverticulitis with microperforation and perisigmoid abscess.   -no clinical signs of sepsis  -on zosyn 3.375 gm IV q8h  -reason for abx use and side effects reviewed with patient; monitor BMP  -surgical evaluation appreciated  -old chart reviewed to assess prior cultures  -monitor temps  -f/u CBC  -supportive care  2. Other issues:   -care per medicine   47 y/o Male with h/o HTN, migraine, cholecystectomy was admitted on 10/22 for LLQ pain x several days, but no fever. No n/v., but has diarrhea. Pain comes and goes and is very sharp, radiates to bladder area. Went to UC and sent to ED for further eval. In ER he received zosyn.     1. Acute sigmoid diverticulitis with microperforation and perisigmoid abscess.   -no clinical signs of sepsis  -on zosyn 3.375 gm IV q8h  -reason for abx use and side effects reviewed with patient; monitor BMP  -surgical evaluation appreciated  -old chart reviewed to assess prior cultures  -abdomen is improving  -continue IV abx coverage for now, plan to change to PO abx coverage in AM if continues to improve  -monitor temps  -f/u CBC  -supportive care  2. Other issues:   -care per medicine

## 2023-10-26 ENCOUNTER — TRANSCRIPTION ENCOUNTER (OUTPATIENT)
Age: 48
End: 2023-10-26

## 2023-10-26 VITALS
DIASTOLIC BLOOD PRESSURE: 78 MMHG | HEART RATE: 74 BPM | OXYGEN SATURATION: 96 % | TEMPERATURE: 99 F | SYSTOLIC BLOOD PRESSURE: 141 MMHG | RESPIRATION RATE: 16 BRPM

## 2023-10-26 PROCEDURE — 99239 HOSP IP/OBS DSCHRG MGMT >30: CPT

## 2023-10-26 PROCEDURE — 99232 SBSQ HOSP IP/OBS MODERATE 35: CPT

## 2023-10-26 RX ORDER — CHLORTHALIDONE 50 MG
1 TABLET ORAL
Qty: 0 | Refills: 0 | DISCHARGE

## 2023-10-26 RX ORDER — CHLORTHALIDONE 50 MG
1 TABLET ORAL
Qty: 30 | Refills: 0
Start: 2023-10-26 | End: 2023-11-24

## 2023-10-26 RX ORDER — AZELASTINE 137 UG/1
2 SPRAY, METERED NASAL
Refills: 0 | DISCHARGE

## 2023-10-26 RX ORDER — CHOLECALCIFEROL (VITAMIN D3) 125 MCG
1 CAPSULE ORAL
Refills: 0 | DISCHARGE

## 2023-10-26 RX ORDER — FLUTICASONE PROPIONATE 50 MCG
1 SPRAY, SUSPENSION NASAL
Refills: 0 | DISCHARGE

## 2023-10-26 RX ADMIN — HEPARIN SODIUM 5000 UNIT(S): 5000 INJECTION INTRAVENOUS; SUBCUTANEOUS at 05:13

## 2023-10-26 RX ADMIN — AMLODIPINE BESYLATE 5 MILLIGRAM(S): 2.5 TABLET ORAL at 10:00

## 2023-10-26 RX ADMIN — PIPERACILLIN AND TAZOBACTAM 25 GRAM(S): 4; .5 INJECTION, POWDER, LYOPHILIZED, FOR SOLUTION INTRAVENOUS at 10:00

## 2023-10-26 RX ADMIN — TELMISARTAN 40 MILLIGRAM(S): 20 TABLET ORAL at 10:01

## 2023-10-26 RX ADMIN — Medication 80 MILLIGRAM(S): at 10:01

## 2023-10-26 RX ADMIN — PIPERACILLIN AND TAZOBACTAM 25 GRAM(S): 4; .5 INJECTION, POWDER, LYOPHILIZED, FOR SOLUTION INTRAVENOUS at 01:14

## 2023-10-26 NOTE — DISCHARGE NOTE PROVIDER - CARE PROVIDER_API CALL
Fer Child  Surgery  [address] [place] [id number]  Phone: [phone]  Fax: [phone]  Follow Up Time: 2 weeks

## 2023-10-26 NOTE — DISCHARGE NOTE NURSING/CASE MANAGEMENT/SOCIAL WORK - NSDCPEFALRISK_GEN_ALL_CORE
For information on Fall & Injury Prevention, visit: [email or web address] OR  [email or web address] OR  https://www.cdc.gov/steadi/patient.html

## 2023-10-26 NOTE — DISCHARGE NOTE NURSING/CASE MANAGEMENT/SOCIAL WORK - PATIENT PORTAL LINK FT
You can access the FollowMyHealth Patient Portal offered by [facility] by registering at the following website: [email or web address]. By joining [facility] FollowMyHealth portal, you will also be able to view your health information using other applications (apps) compatible with our system.

## 2023-10-26 NOTE — DISCHARGE NOTE PROVIDER - NSDCMRMEDTOKEN_GEN_ALL_CORE_FT
amLODIPine 5 mg oral tablet: 1 tab(s) orally once a day  amoxicillin-clavulanate 875 mg-125 mg oral tablet: 1 tab(s) orally 2 times a day  chlorthalidone 25 mg oral tablet: 1 tab(s) orally once a day  propranoloL 80 mg oral capsule, extended release: 1 cap(s) orally once a day  telmisartan 40 mg oral tablet: 1 tab(s) orally once a day  Tums 500 mg oral tablet, chewable: 1 tab(s) chewed 2 times a day as needed for

## 2023-10-26 NOTE — DISCHARGE NOTE PROVIDER - HOSPITAL COURSE
49 y/o Male with h/o HTN, migraine, cholecystectomy was admitted on 10/22 for LLQ pain x several days, but no fever. No n/v., but has diarrhea. Pain comes and goes and is very sharp, radiates to bladder area. Went to UC and sent to ED for further eval. Pt received zosyn.     Acute sigmoid diverticulitis with microperforation and perisigmoid abscess.   -no clinical signs of sepsis  -on zosyn 3.375 gm IV q8h # 4  -tolerating abx well so far; no side effects noted  -abdomen is much improved  -tolerating PO intake  -No surgical intervention  -change abx to augmentin 875 gm PO q12h for 3 more weeks  -if recurrent fever, pain or diarrhea, Pt have to call office  -f/u with Dr. Child out patient for repeat CT abdomen in about 2 week  HTN:   Cont norvasc, arb  can resume diuretic    -Above plan D/W Dr. Child 49 y/o Male with h/o HTN, migraine, cholecystectomy was admitted on 10/22 for LLQ pain x several days, but no fever. No n/v., but has diarrhea. Pain comes and goes and is very sharp, radiates to bladder area. Went to UC and sent to ED for further eval. Pt received zosyn.     Acute sigmoid diverticulitis with microperforation and perisigmoid abscess.   -no clinical signs of sepsis  -on zosyn 3.375 gm IV q8h # 4  -tolerating abx well so far; no side effects noted  -abdomen is much improved  -tolerating PO intake  -No surgical intervention  -change abx to augmentin 875 gm PO q12h for 3 more weeks  -if recurrent fever, pain or diarrhea, Pt have to call office  -f/u with Dr. Child out patient for repeat CT abdomen in about 2 week  HTN:   Cont norvasc, arb  can resume diuretic    -Above plan D/W Dr. Child    40 minutes assessing presenting problems of acute illness,  as well as medical decision making including initiating plan of care, reviewing data, discussing goals of care with patient/family, and writing this note. 49 y/o Male with h/o HTN, migraine, cholecystectomy was admitted on 10/22 for LLQ pain x several days, but no fever. No n/v., but has diarrhea. Pain comes and goes and is very sharp, radiates to bladder area. Went to  and sent to ED for further eval. Pt received zosyn.     CT scan of the abdomen and pelvis showed Acute sigmoid diverticulitis with microperforation and perisigmoid, intra abdominal abscess.  -no clinical signs of sepsis  -on zosyn 3.375 gm IV q8h # 4  -tolerating abx well so far; no side effects noted  -abdomen is much improved  -tolerating PO intake  -No surgical intervention  -change abx to augmentin 875 gm PO q12h for 3 more weeks  -if recurrent fever, pain or diarrhea, Pt have to call office  -f/u with Dr. Child out patient for repeat CT abdomen in about 2 week  HTN:   Cont norvasc, arb  can resume diuretic    -Above plan D/W Dr. Child    40 minutes assessing presenting problems of acute illness,  as well as medical decision making including initiating plan of care, reviewing data, discussing goals of care with patient/family, and writing this note.

## 2023-10-26 NOTE — DISCHARGE NOTE PROVIDER - NSDCCPCAREPLAN_GEN_ALL_CORE_FT
PRINCIPAL DISCHARGE DIAGNOSIS  Diagnosis: Diverticulitis of colon with perforation  Assessment and Plan of Treatment: c/w Augmentin 3 more week, F/U Dr. bee office in 2week for Rpt CT Abdomen

## 2023-10-26 NOTE — PROGRESS NOTE ADULT - SUBJECTIVE AND OBJECTIVE BOX
48y old  Male who presents with a chief complaint of abdominal pain (25 Oct 2023 08:54). PMHX:  HTN presented to [facility] ED for eval LLQ pain. In ED found to have sigmoid diverticulitis with microperforation and admitted to surg service. Seen by Surgical team ans was started on IV abx's. Seen by ID Dr. Combs w/ writer @ bedside. Today reports he's been ambulating in hallway and passing wind, as well as had a BM today. Yolanda low reside diet. No N/V. No fever or chills. Last colonoscopy 11/2022.             PAST MEDICAL & SURGICAL HISTORY:  Migraine      Hypertension      CHRISTINA (obstructive sleep apnea)      Deviated septum      S/P cholecystectomy  2004      H/O vasectomy  2009      History of surgery  retrieval of sperm after vasectomy          MEDICATIONS  (STANDING):  amLODIPine   Tablet 5 milliGRAM(s) Oral daily  dextrose 5% + sodium chloride 0.9% with potassium chloride 20 mEq/L 1000 milliLiter(s) (125 mL/Hr) IV Continuous <Continuous>  heparin   Injectable 5000 Unit(s) SubCutaneous every 8 hours  influenza   Vaccine 0.5 milliLiter(s) IntraMuscular once  piperacillin/tazobactam IVPB.. 3.375 Gram(s) IV Intermittent every 8 hours  propranolol LA 80 milliGRAM(s) Oral daily  telmisartan 40 milliGRAM(s) Oral daily    MEDICATIONS  (PRN):  ondansetron Injectable 4 milliGRAM(s) IV Push every 6 hours PRN Nausea and/or Vomiting      REVIEW OF SYSTEMS:    RESPIRATORY: No shortness of breath  CARDIOVASCULAR: No chest pain  All other review of systems is negative unless indicated above.    Vital Signs Last 24 Hrs  T(C): 37.6 (25 Oct 2023 07:57), Max: 37.6 (25 Oct 2023 07:57)  T(F): 99.6 (25 Oct 2023 07:57), Max: 99.6 (25 Oct 2023 07:57)  HR: 64 (25 Oct 2023 07:57) (62 - 64)  BP: 131/79 (25 Oct 2023 07:57) (127/72 - 131/79)  BP(mean): --  RR: 16 (25 Oct 2023 07:57) (16 - 18)  SpO2: 98% (25 Oct 2023 07:57) (94% - 98%)    Parameters below as of 25 Oct 2023 07:57  Patient On (Oxygen Delivery Method): room air        PHYSICAL EXAM:    Constitutional: NAD, well-developed, sitting up in chair @ bedside  Respiratory: CTAB  Cardiovascular: S1 and S2, RRR  Gastrointestinal: BS+, soft, mild TTP suprapubic region  Extremities: No peripheral edema  Psychiatric: Normal mood, normal affect    LABS:                        12.5   8.19  )-----------( 255      ( 25 Oct 2023 08:13 )             37.4     10-25    137  |  105  |  4<L>  ----------------------------<  103<H>  3.8   |  27  |  0.78    Ca    8.4<L>      25 Oct 2023 08:13            RADIOLOGY & ADDITIONAL STUDIES:  < from: CT Abdomen and Pelvis No Cont (10.22.23 @ 18:47) >  ACC: [id number] EXAM:  CT ABDOMEN AND PELVIS   ORDERED BY: MAURO VALE     PROCEDURE DATE:  10/22/2023          INTERPRETATION:  CLINICAL INFORMATION: Left flank pain, diarrhea    COMPARISON: None.    CONTRAST/COMPLICATIONS:  IV Contrast: None  Oral Contrast: None  Complications: None reported    PROCEDURE:  CT of the Abdomen and Pelvis was performed in the prone position.  Sagittal and coronal reformats were performed.    FINDINGS:    LOWER CHEST: No visualized pleural effusion. Few sub-4 mm pulmonary   nodules. For reference, 2 mm left lower lobe nodule, image 8 series 2,   and 3 mm left lower lobe nodule image 15 series 2.    Solid organ evaluation limited due to noncontrast technique.    LIVER: Enlarged, 20 cm craniocaudal dimension with diffuse steatosis.   Partially recanalized paraumbilical vein  BILE DUCTS: No distention  GALLBLADDER: Cholecystectomy.  SPLEEN: Spleen size within normal limits  PANCREAS: No acute peripancreatic inflammation  ADRENALS: Unremarkable  KIDNEYS/URETERS: No hydronephrosis or obstructing ureteral calculus. 2 mm   nonobstructing left renal calculus. Left renal cyst. Additional small   renal hypodensities are too small to characterize. Nonspecific bilateral   perinephric stranding.    BLADDER: Minimally distended.  REPRODUCTIVE ORGANS: Prostate size is within normal limits.    BOWEL and PERITONEUM: Limited noncontrast evaluation. Stomach is under   distended. No small bowel distention. Appendix is unremarkable. Mild   stool burden of the colon limits evaluation of the colonic mucosa.   Colonic diverticulosis with acute diverticulitis of the mid sigmoid   colon. Trace adjacent droplets of extraluminal air and 2.2 cm perisigmoid   air/fluid on image 39 series 602, indicating localized perforation. No   drainable fluid collection. No distant free air.  PERITONEUM: See above 'bowel and peritoneum' section.  VESSELS: No abdominal aortic aneurysm. Atheromatous changes.  RETROPERITONEUM/LYMPH NODES: Small volume nodes.  ABDOMINAL WALL: Small fat-containing right inguinal hernia.  BONES: Degenerative changes.    IMPRESSION:    Limited noncontrast study.      < end of copied text >  
CC-  abdominal pain (23 Oct 2023 10:31)    HPI:  47yo/M with PMH HTN presented with few days of LLQ pain. no fever. No n/v. +diarrhea. Pain comes and goes and is very sharp, radiates to bladder area. In ED found to have sigmoid diverticulitis with microperforation and admitted to surg service. Hospitalist consulted for preop medical clearance. Patient denies known cardiac issues, no stents, no prior stress test. Denies chest pain or dyspnea on exertion or rest. Not on AC    PMH- as above  PSH- cholecystectomy  Soc hx- denies smoking, no alcohol  Fam hx M breast ca, F CLL    10/23/23- has been NPO since admission, pain has subsided and feels better.  10/24/23- started on clears. Denies pain today.   10/25/23- feels better, no pain. Ambulating    Review of system- All 10 systems reviewed and is as per HPI otherwise negative.     Vital Signs Last 24 Hrs  T(C): 37.6 (25 Oct 2023 07:57), Max: 37.6 (25 Oct 2023 07:57)  T(F): 99.6 (25 Oct 2023 07:57), Max: 99.6 (25 Oct 2023 07:57)  HR: 64 (25 Oct 2023 07:57) (64 - 64)  BP: 131/79 (25 Oct 2023 07:57) (127/72 - 131/79)  BP(mean): --  RR: 16 (25 Oct 2023 07:57) (16 - 18)  SpO2: 98% (25 Oct 2023 07:57) (94% - 98%)    Parameters below as of 25 Oct 2023 07:57  Patient On (Oxygen Delivery Method): room air    LABS:                                12.5   8.19  )-----------( 255      ( 25 Oct 2023 08:13 )             37.4     25 Oct 2023 08:13    137    |  105    |  4      ----------------------------<  103    3.8     |  27     |  0.78     Ca    8.4        25 Oct 2023 08:13    Urinalysis Basic - ( 25 Oct 2023 08:13 )  Color: x / Appearance: x / SG: x / pH: x  Gluc: 103 mg/dL / Ketone: x  / Bili: x / Urobili: x   Blood: x / Protein: x / Nitrite: x   Leuk Esterase: x / RBC: x / WBC x   Sq Epi: x / Non Sq Epi: x / Bacteria: x    RADIOLOGY & ADDITIONAL TESTS:  ACC: [id number] EXAM:  CT ABDOMEN AND PELVIS   ORDERED BY: MAURO VALE   PROCEDURE DATE:  10/22/2023      INTERPRETATION:  CLINICAL INFORMATION: Left flank pain, diarrhea    COMPARISON: None.    CONTRAST/COMPLICATIONS:  IV Contrast: None  Oral Contrast: None  Complications: None reported    PROCEDURE:  CT of the Abdomen and Pelvis was performed in the prone position.  Sagittal and coronal reformats were performed.    FINDINGS:    LOWER CHEST: No visualized pleural effusion. Few sub-4 mm pulmonary   nodules. For reference, 2 mm left lower lobe nodule, image 8 series 2,   and 3 mm left lower lobe nodule image 15 series 2.    Solid organ evaluation limited due to noncontrast technique.    LIVER: Enlarged, 20 cm craniocaudal dimension with diffuse steatosis.   Partially recanalized paraumbilical vein  BILE DUCTS: No distention  GALLBLADDER: Cholecystectomy.  SPLEEN: Spleen size within normal limits  PANCREAS: No acute peripancreatic inflammation  ADRENALS: Unremarkable  KIDNEYS/URETERS: No hydronephrosis or obstructing ureteral calculus. 2 mm   nonobstructing left renal calculus. Left renal cyst. Additional small   renal hypodensities are too small to characterize. Nonspecific bilateral   perinephric stranding.    BLADDER: Minimally distended.  REPRODUCTIVE ORGANS: Prostate size is within normal limits.    BOWEL and PERITONEUM: Limited noncontrast evaluation. Stomach is under   distended. No small bowel distention. Appendix is unremarkable. Mild   stool burden of the colon limits evaluation of the colonic mucosa.   Colonic diverticulosis with acute diverticulitis of the mid sigmoid   colon. Trace adjacent droplets of extraluminal air and 2.2 cm perisigmoid   air/fluid on image 39 series 602, indicating localized perforation. No   drainable fluid collection. No distant free air.  PERITONEUM: See above 'bowel and peritoneum' section.  VESSELS: No abdominal aortic aneurysm. Atheromatous changes.  RETROPERITONEUM/LYMPH NODES: Small volume nodes.  ABDOMINAL WALL: Small fat-containing right inguinal hernia.  BONES: Degenerative changes.    IMPRESSION:    Limited noncontrast study.    Acute mid sigmoid colon diverticulitis. Trace adjacent droplets of   extraluminal air and 2.2 cm perisigmoid air/fluid on image 39 series 602,   indicating localized perforation. No drainable fluid collection. No   distant free air. Follow to resolution. Recommend colonoscopy once acute   symptoms have resolved.    No hydronephrosis or obstructing ureteral calculus. 2 mm nonobstructing   left renal calculus.    Sub-4 mm pulmonary nodules can be followed with dedicated chest CT in 12   months, or as per patient risk factors.    PHYSICAL EXAM:  GENERAL: NAD, well-groomed, well-developed  HEAD:  Atraumatic, Normocephalic  EYES: EOMI, PERRLA, conjunctiva and sclera clear  HEENT: Moist mucous membranes  NECK: Supple, No JVD  NERVOUS SYSTEM:  Alert & Oriented X3, Motor Strength 5/5 B/L upper and lower extremities; DTRs 2+ intact and symmetric  CHEST/LUNG: Clear to auscultation bilaterally; No rales, rhonchi, wheezing, or rubs  HEART: Regular rate and rhythm; No murmurs, rubs, or gallops  ABDOMEN: Soft, Nontender, Nondistended; Bowel sounds present  GENITOURINARY- Voiding, no palpable bladder  EXTREMITIES:  2+ Peripheral Pulses, No clubbing, cyanosis, or edema  MUSCULOSKELTAL- No muscle tenderness, Muscle tone normal, No joint tenderness, no Joint swelling, Joint range of motion-normal  SKIN-no rash, no lesion  CNS- alert, oriented X3, non focal     MEDICATIONS  (STANDING):  acetaminophen   IVPB .. 1000 milliGRAM(s) IV Intermittent once  amLODIPine   Tablet 5 milliGRAM(s) Oral daily  chlorthalidone 25 milliGRAM(s) Oral daily  dextrose 5% + sodium chloride 0.9% with potassium chloride 20 mEq/L 1000 milliLiter(s) (125 mL/Hr) IV Continuous <Continuous>  heparin   Injectable 5000 Unit(s) SubCutaneous every 8 hours  influenza   Vaccine 0.5 milliLiter(s) IntraMuscular once  losartan 50 milliGRAM(s) Oral daily  piperacillin/tazobactam IVPB.. 3.375 Gram(s) IV Intermittent every 8 hours  propranolol LA 80 milliGRAM(s) Oral daily  telmisartan 40 milliGRAM(s) Oral daily    MEDICATIONS  (PRN):  ondansetron Injectable 4 milliGRAM(s) IV Push every 6 hours PRN Nausea and/or Vomiting    Assessment/Plan  #Acute sigmoid diverticulitis with contained perforation  Surg team following  imaging studies reviewed  Diet advanced  Dc IVF  Pain meds prn  IV abx  ID eval noted- likely no need for IV abx on discharge as can be switched to PO    #HTN  Cont norvasc, arb  can resume diuretic    #DVT proph- heparin sq    #Dispo- cont IV abx. Per surg team    
Feels well, less pain  VSS afeb  Abd- + BS soft, tender to deep palpation, no guarding.   Ext- no edema    WBC Count: 8.62 K/uL (10.23.23 @ 08:16)   WBC Count: 11.49 K/uL (10.22.23 @ 17:51) 
Feels well, tolerating diet  VSS afeb  Abd- + BS soft less tender, no guarding, no rebound  Ext- no edema
Patient is a 48y old  Male who presents with a chief complaint of abdominal pain (23 Oct 2023 20:37)      Subective:  Pain unchaged  Notes discomfort with urination and BMs.    PAST MEDICAL & SURGICAL HISTORY:  Migraine      Hypertension      CHRISTINA (obstructive sleep apnea)      Deviated septum      S/P cholecystectomy  2004      H/O vasectomy  2009      History of surgery  retrieval of sperm after vasectomy          MEDICATIONS  (STANDING):  amLODIPine   Tablet 5 milliGRAM(s) Oral daily  dextrose 5% + sodium chloride 0.9% with potassium chloride 20 mEq/L 1000 milliLiter(s) (125 mL/Hr) IV Continuous <Continuous>  heparin   Injectable 5000 Unit(s) SubCutaneous every 8 hours  influenza   Vaccine 0.5 milliLiter(s) IntraMuscular once  piperacillin/tazobactam IVPB.. 3.375 Gram(s) IV Intermittent every 8 hours  propranolol LA 80 milliGRAM(s) Oral daily  telmisartan 40 milliGRAM(s) Oral daily    MEDICATIONS  (PRN):  ondansetron Injectable 4 milliGRAM(s) IV Push every 6 hours PRN Nausea and/or Vomiting      REVIEW OF SYSTEMS:    RESPIRATORY: No shortness of breath  CARDIOVASCULAR: No chest pain  All other review of systems is negative unless indicated above.    Vital Signs Last 24 Hrs  T(C): 36.8 (24 Oct 2023 04:10), Max: 36.8 (23 Oct 2023 08:00)  T(F): 98.2 (24 Oct 2023 04:10), Max: 98.2 (23 Oct 2023 08:00)  HR: 55 (24 Oct 2023 04:10) (55 - 66)  BP: 110/90 (24 Oct 2023 04:10) (110/90 - 123/76)  BP(mean): --  RR: 18 (24 Oct 2023 04:10) (17 - 18)  SpO2: 98% (24 Oct 2023 04:10) (95% - 98%)    Parameters below as of 24 Oct 2023 04:10  Patient On (Oxygen Delivery Method): room air        PHYSICAL EXAM:    Constitutional: NAD, well-developed  Respiratory: CTAB  Cardiovascular: S1 and S2, RRR  Gastrointestinal: BS+, soft, mod LLQ tend  Extremities: No peripheral edema  Psychiatric: Normal mood, normal affect    LABS:                        13.0   8.62  )-----------( 243      ( 23 Oct 2023 08:16 )             39.3     10-23    138  |  106  |  10  ----------------------------<  107<H>  3.7   |  28  |  0.80    Ca    9.0      23 Oct 2023 08:16    TPro  7.7  /  Alb  3.7  /  TBili  0.9  /  DBili  x   /  AST  15  /  ALT  62  /  AlkPhos  88  10-22      LIVER FUNCTIONS - ( 22 Oct 2023 17:51 )  Alb: 3.7 g/dL / Pro: 7.7 gm/dL / ALK PHOS: 88 U/L / ALT: 62 U/L / AST: 15 U/L / GGT: x             RADIOLOGY & ADDITIONAL STUDIES:
Date of service: 10-26-23 @ 09:56    Lying in bed in NAD  Tolerating PO intake  Has abdominal cramps on and off  Has soft stools  Low grade fever    ROS: denies dizziness, no HA, no SOB or cough, no abdominal pain, no dysuria, no legs pain, no rashes    MEDICATIONS  (STANDING):  amLODIPine   Tablet 5 milliGRAM(s) Oral daily  dextrose 5% + sodium chloride 0.9% with potassium chloride 20 mEq/L 1000 milliLiter(s) (125 mL/Hr) IV Continuous <Continuous>  heparin   Injectable 5000 Unit(s) SubCutaneous every 8 hours  influenza   Vaccine 0.5 milliLiter(s) IntraMuscular once  piperacillin/tazobactam IVPB.. 3.375 Gram(s) IV Intermittent every 8 hours  propranolol LA 80 milliGRAM(s) Oral daily  telmisartan 40 milliGRAM(s) Oral daily    Vital Signs Last 24 Hrs  T(C): 37 (26 Oct 2023 08:20), Max: 37.2 (26 Oct 2023 00:04)  T(F): 98.6 (26 Oct 2023 08:20), Max: 99 (26 Oct 2023 00:04)  HR: 74 (26 Oct 2023 08:20) (63 - 74)  BP: 141/78 (26 Oct 2023 08:20) (125/66 - 141/78)  BP(mean): --  RR: 16 (26 Oct 2023 08:20) (16 - 16)  SpO2: 96% (26 Oct 2023 08:20) (96% - 98%)    Parameters below as of 26 Oct 2023 08:20  Patient On (Oxygen Delivery Method): room air     Physical exam:    Constitutional:  No acute distress  HEENT: NC/AT, EOMI, PERRLA, conjunctivae clear; ears and nose atraumatic  Neck: supple; thyroid not palpable  Back: no tenderness  Respiratory: respiratory effort normal; clear to auscultation  Cardiovascular: S1S2 regular, no murmurs  Abdomen: soft, lower abdomen tender, not distended, positive BS  Genitourinary: no suprapubic tenderness  Lymphatic: no LN palpable  Musculoskeletal: no muscle tenderness, no joint swelling or tenderness  Extremities: no pedal edema  Neurological/ Psychiatric: AxOx3, judgement and insight normal; moving all extremities  Skin: no rashes; no palpable lesions    Labs: reviewed                        12.5   8.19  )-----------( 255      ( 25 Oct 2023 08:13 )             37.4     10-25    137  |  105  |  4<L>  ----------------------------<  103<H>  3.8   |  27  |  0.78    Ca    8.4<L>      25 Oct 2023 08:13                        12.5   8.19  )-----------( 255      ( 25 Oct 2023 08:13 )             37.4     10-24    140  |  108  |  5<L>  ----------------------------<  97  3.7   |  28  |  0.72    Ca    8.7      24 Oct 2023 08:19    Culture - Urine (collected 22 Oct 2023 17:51)  Source: Clean Catch None  Final Report (23 Oct 2023 16:38):    <10,000 CFU/mL Normal Urogenital Becka    Radiology: all available radiological tests reviewed    < from: CT Abdomen and Pelvis No Cont (10.22.23 @ 18:47) >  Acute mid sigmoid colon diverticulitis. Trace adjacent droplets of   extraluminal air and 2.2 cm perisigmoid air/fluid on image 39 series 602,   indicating localized perforation. No drainable fluid collection. No   distant free air. Follow to resolution. Recommend colonoscopy once acute symptoms have resolved.    No hydronephrosis or obstructing ureteral calculus. 2 mm nonobstructing left renal calculus.    Sub-4 mm pulmonary nodules can be followed with dedicated chest CT in 12 months, or as per patient risk factors.  < end of copied text >    Advanced directives addressed: full resuscitation
Feels well, less pain. + BM  VSS afeb  lungs- clear  cor- RRR  Abd- + BS soft less tender LLQ, no guarding or rebound  Ext- no edema
CC-  abdominal pain    HPI:  47yo/M with PMH HTN presented with few days of LLQ pain. no fever. No n/v. +diarrhea. Pain comes and goes and is very sharp, radiates to bladder area. In ED found to have sigmoid diverticulitis with microperforation and admitted to surg service. Hospitalist consulted for preop medical clearance/med co-mx.     pt seen and examined this am. doing well. tolerating solid food. no sob/chest pain. no difficulty voiding.   minimal pain.     Review of system- All 10 systems reviewed and is as per HPI otherwise negative.     Vital Signs Last 24 Hrs  T(C): 37 (26 Oct 2023 08:20), Max: 37.2 (26 Oct 2023 00:04)  T(F): 98.6 (26 Oct 2023 08:20), Max: 99 (26 Oct 2023 00:04)  HR: 74 (26 Oct 2023 08:20) (63 - 74)  BP: 141/78 (26 Oct 2023 08:20) (125/66 - 141/78)  RR: 16 (26 Oct 2023 08:20) (16 - 16)  SpO2: 96% (26 Oct 2023 08:20) (96% - 98%)    Parameters below as of 26 Oct 2023 08:20  Patient On (Oxygen Delivery Method): room air    PHYSICAL EXAM:    GENERAL: Comfortable, no acute distress   HEAD:  Normocephalic, atraumatic  EYES: EOMI, PERRLA  HEENT: Moist mucous membranes  NECK: Supple, No JVD  NERVOUS SYSTEM:  Alert & Oriented X3, Motor Strength 5/5 B/L upper and lower extremities  CHEST/LUNG: Clear to auscultation bilaterally  HEART: Regular rate and rhythm  ABDOMEN: Soft, Nontender, Nondistended, Bowel sounds present  GENITOURINARY: Voiding, no palpable bladder  EXTREMITIES:   No clubbing, cyanosis, or edema  MUSCULOSKELETAL- No muscle tenderness, no joint tenderness  SKIN-no rash    LABS:                        12.5   8.19  )-----------( 255      ( 25 Oct 2023 08:13 )             37.4     10-25    137  |  105  |  4<L>  ----------------------------<  103<H>  3.8   |  27  |  0.78    Ca    8.4<L>      25 Oct 2023 08:13        Urinalysis Basic - ( 25 Oct 2023 08:13 )    Color: x / Appearance: x / SG: x / pH: x  Gluc: 103 mg/dL / Ketone: x  / Bili: x / Urobili: x   Blood: x / Protein: x / Nitrite: x   Leuk Esterase: x / RBC: x / WBC x   Sq Epi: x / Non Sq Epi: x / Bacteria: x      RADIOLOGY & ADDITIONAL TESTS:  ACC: [id number] EXAM:  CT ABDOMEN AND PELVIS   ORDERED BY: MAURO VALE   PROCEDURE DATE:  10/22/2023      INTERPRETATION:  CLINICAL INFORMATION: Left flank pain, diarrhea    COMPARISON: None.    CONTRAST/COMPLICATIONS:  IV Contrast: None  Oral Contrast: None  Complications: None reported    PROCEDURE:  CT of the Abdomen and Pelvis was performed in the prone position.  Sagittal and coronal reformats were performed.    FINDINGS:    LOWER CHEST: No visualized pleural effusion. Few sub-4 mm pulmonary   nodules. For reference, 2 mm left lower lobe nodule, image 8 series 2,   and 3 mm left lower lobe nodule image 15 series 2.    Solid organ evaluation limited due to noncontrast technique.    LIVER: Enlarged, 20 cm craniocaudal dimension with diffuse steatosis.   Partially recanalized paraumbilical vein  BILE DUCTS: No distention  GALLBLADDER: Cholecystectomy.  SPLEEN: Spleen size within normal limits  PANCREAS: No acute peripancreatic inflammation  ADRENALS: Unremarkable  KIDNEYS/URETERS: No hydronephrosis or obstructing ureteral calculus. 2 mm   nonobstructing left renal calculus. Left renal cyst. Additional small   renal hypodensities are too small to characterize. Nonspecific bilateral   perinephric stranding.    BLADDER: Minimally distended.  REPRODUCTIVE ORGANS: Prostate size is within normal limits.    BOWEL and PERITONEUM: Limited noncontrast evaluation. Stomach is under   distended. No small bowel distention. Appendix is unremarkable. Mild   stool burden of the colon limits evaluation of the colonic mucosa.   Colonic diverticulosis with acute diverticulitis of the mid sigmoid   colon. Trace adjacent droplets of extraluminal air and 2.2 cm perisigmoid   air/fluid on image 39 series 602, indicating localized perforation. No   drainable fluid collection. No distant free air.  PERITONEUM: See above 'bowel and peritoneum' section.  VESSELS: No abdominal aortic aneurysm. Atheromatous changes.  RETROPERITONEUM/LYMPH NODES: Small volume nodes.  ABDOMINAL WALL: Small fat-containing right inguinal hernia.  BONES: Degenerative changes.    IMPRESSION:    Limited noncontrast study.    Acute mid sigmoid colon diverticulitis. Trace adjacent droplets of   extraluminal air and 2.2 cm perisigmoid air/fluid on image 39 series 602,   indicating localized perforation. No drainable fluid collection. No   distant free air. Follow to resolution. Recommend colonoscopy once acute   symptoms have resolved.    No hydronephrosis or obstructing ureteral calculus. 2 mm nonobstructing   left renal calculus.    Sub-4 mm pulmonary nodules can be followed with dedicated chest CT in 12   months, or as per patient risk factors.    MEDICATIONS  (STANDING):  amLODIPine   Tablet 5 milliGRAM(s) Oral daily  dextrose 5% + sodium chloride 0.9% with potassium chloride 20 mEq/L 1000 milliLiter(s) (125 mL/Hr) IV Continuous <Continuous>  heparin   Injectable 5000 Unit(s) SubCutaneous every 8 hours  piperacillin/tazobactam IVPB.. 3.375 Gram(s) IV Intermittent every 8 hours  propranolol LA 80 milliGRAM(s) Oral daily  telmisartan 40 milliGRAM(s) Oral daily    MEDICATIONS  (PRN):  ondansetron Injectable 4 milliGRAM(s) IV Push every 6 hours PRN Nausea and/or Vomiting    Assessment/Plan  #Acute sigmoid diverticulitis with contained perforation  -last dose iv abx today per ID, plan on changing to po on dc.   -diet advanced  -ambulating.   -pain is controlled.   -will need to repeat imaging as outpt to ensure resolution.    #HTN  -Cont norvasc, arb, bb  -resume diuretic on dc.    #DVT proph- heparin sq    
CC-  abdominal pain (23 Oct 2023 10:31)    HPI:  49yo/M with PMH HTN presented with few days of LLQ pain. no fever. No n/v. +diarrhea. Pain comes and goes and is very sharp, radiates to bladder area. In ED found to have sigmoid diverticulitis with microperforation and admitted to surg service. Hospitalist consulted for preop medical clearance. Patient denies known cardiac issues, no stents, no prior stress test. Denies chest pain or dyspnea on exertion or rest. Not on AC    PMH- as above  PSH- cholecystectomy  Soc hx- denies smoking, no alcohol  Fam hx M breast ca, F CLL    10/23/23- has been NPO since admission, pain has subsided and feels better.  10/24/23- started on clears. Denies pain today.     Review of system- All 10 systems reviewed and is as per HPI otherwise negative.     Vital Signs Last 24 Hrs  T(C): 37.3 (24 Oct 2023 16:00), Max: 37.3 (24 Oct 2023 16:00)  T(F): 99.1 (24 Oct 2023 16:00), Max: 99.1 (24 Oct 2023 16:00)  HR: 62 (24 Oct 2023 16:00) (55 - 66)  BP: 131/79 (24 Oct 2023 16:00) (110/90 - 131/79)  BP(mean): --  RR: 18 (24 Oct 2023 16:00) (16 - 18)  SpO2: 97% (24 Oct 2023 16:00) (95% - 98%)    Parameters below as of 24 Oct 2023 16:00  Patient On (Oxygen Delivery Method): room air    LABS:                        13.1   7.13  )-----------( 258      ( 24 Oct 2023 08:19 )             39.8     24 Oct 2023 08:19    140    |  108    |  5      ----------------------------<  97     3.7     |  28     |  0.72     Ca    8.7        24 Oct 2023 08:19    TPro  7.7    /  Alb  3.7    /  TBili  0.9    /  DBili  x      /  AST  15     /  ALT  62     /  AlkPhos  88     22 Oct 2023 17:51    LIVER FUNCTIONS - ( 22 Oct 2023 17:51 )  Alb: 3.7 g/dL / Pro: 7.7 gm/dL / ALK PHOS: 88 U/L / ALT: 62 U/L / AST: 15 U/L / GGT: x           Urinalysis Basic - ( 24 Oct 2023 08:19 )  Color: x / Appearance: x / SG: x / pH: x  Gluc: 97 mg/dL / Ketone: x  / Bili: x / Urobili: x   Blood: x / Protein: x / Nitrite: x   Leuk Esterase: x / RBC: x / WBC x   Sq Epi: x / Non Sq Epi: x / Bacteria: x    RADIOLOGY & ADDITIONAL TESTS:  ACC: [id number] EXAM:  CT ABDOMEN AND PELVIS   ORDERED BY: MAURO VALE   PROCEDURE DATE:  10/22/2023      INTERPRETATION:  CLINICAL INFORMATION: Left flank pain, diarrhea    COMPARISON: None.    CONTRAST/COMPLICATIONS:  IV Contrast: None  Oral Contrast: None  Complications: None reported    PROCEDURE:  CT of the Abdomen and Pelvis was performed in the prone position.  Sagittal and coronal reformats were performed.    FINDINGS:    LOWER CHEST: No visualized pleural effusion. Few sub-4 mm pulmonary   nodules. For reference, 2 mm left lower lobe nodule, image 8 series 2,   and 3 mm left lower lobe nodule image 15 series 2.    Solid organ evaluation limited due to noncontrast technique.    LIVER: Enlarged, 20 cm craniocaudal dimension with diffuse steatosis.   Partially recanalized paraumbilical vein  BILE DUCTS: No distention  GALLBLADDER: Cholecystectomy.  SPLEEN: Spleen size within normal limits  PANCREAS: No acute peripancreatic inflammation  ADRENALS: Unremarkable  KIDNEYS/URETERS: No hydronephrosis or obstructing ureteral calculus. 2 mm   nonobstructing left renal calculus. Left renal cyst. Additional small   renal hypodensities are too small to characterize. Nonspecific bilateral   perinephric stranding.    BLADDER: Minimally distended.  REPRODUCTIVE ORGANS: Prostate size is within normal limits.    BOWEL and PERITONEUM: Limited noncontrast evaluation. Stomach is under   distended. No small bowel distention. Appendix is unremarkable. Mild   stool burden of the colon limits evaluation of the colonic mucosa.   Colonic diverticulosis with acute diverticulitis of the mid sigmoid   colon. Trace adjacent droplets of extraluminal air and 2.2 cm perisigmoid   air/fluid on image 39 series 602, indicating localized perforation. No   drainable fluid collection. No distant free air.  PERITONEUM: See above 'bowel and peritoneum' section.  VESSELS: No abdominal aortic aneurysm. Atheromatous changes.  RETROPERITONEUM/LYMPH NODES: Small volume nodes.  ABDOMINAL WALL: Small fat-containing right inguinal hernia.  BONES: Degenerative changes.    IMPRESSION:    Limited noncontrast study.    Acute mid sigmoid colon diverticulitis. Trace adjacent droplets of   extraluminal air and 2.2 cm perisigmoid air/fluid on image 39 series 602,   indicating localized perforation. No drainable fluid collection. No   distant free air. Follow to resolution. Recommend colonoscopy once acute   symptoms have resolved.    No hydronephrosis or obstructing ureteral calculus. 2 mm nonobstructing   left renal calculus.    Sub-4 mm pulmonary nodules can be followed with dedicated chest CT in 12   months, or as per patient risk factors.      PHYSICAL EXAM:  GENERAL: NAD, well-groomed, well-developed  HEAD:  Atraumatic, Normocephalic  EYES: EOMI, PERRLA, conjunctiva and sclera clear  HEENT: Moist mucous membranes  NECK: Supple, No JVD  NERVOUS SYSTEM:  Alert & Oriented X3, Motor Strength 5/5 B/L upper and lower extremities; DTRs 2+ intact and symmetric  CHEST/LUNG: Clear to auscultation bilaterally; No rales, rhonchi, wheezing, or rubs  HEART: Regular rate and rhythm; No murmurs, rubs, or gallops  ABDOMEN: Soft, Nontender, Nondistended; Bowel sounds present  GENITOURINARY- Voiding, no palpable bladder  EXTREMITIES:  2+ Peripheral Pulses, No clubbing, cyanosis, or edema  MUSCULOSKELTAL- No muscle tenderness, Muscle tone normal, No joint tenderness, no Joint swelling, Joint range of motion-normal  SKIN-no rash, no lesion  CNS- alert, oriented X3, non focal     MEDICATIONS  (STANDING):  acetaminophen   IVPB .. 1000 milliGRAM(s) IV Intermittent once  amLODIPine   Tablet 5 milliGRAM(s) Oral daily  chlorthalidone 25 milliGRAM(s) Oral daily  dextrose 5% + sodium chloride 0.9% with potassium chloride 20 mEq/L 1000 milliLiter(s) (125 mL/Hr) IV Continuous <Continuous>  heparin   Injectable 5000 Unit(s) SubCutaneous every 8 hours  influenza   Vaccine 0.5 milliLiter(s) IntraMuscular once  losartan 50 milliGRAM(s) Oral daily  piperacillin/tazobactam IVPB.. 3.375 Gram(s) IV Intermittent every 8 hours  propranolol LA 80 milliGRAM(s) Oral daily  telmisartan 40 milliGRAM(s) Oral daily    MEDICATIONS  (PRN):  ondansetron Injectable 4 milliGRAM(s) IV Push every 6 hours PRN Nausea and/or Vomiting    Assessment/Plan  #Acute sigmoid diverticulitis with contained perforation  Surg team following  imaging studies reviewed  Clears, advance per surg team  IVF  Pain meds prn  IV abx  Would need ID eval to determine need for IV abx on discharge    #HTN  Cont norvasc, arb  hold diuretic while npo    #DVT proph- heparin sq    #Dispo- cont IV abx. Per surg team

## 2023-10-26 NOTE — PROGRESS NOTE ADULT - PROVIDER SPECIALTY LIST ADULT
Hospitalist
Hospitalist
Surgery
Surgery
Hospitalist
Infectious Disease
Surgery
Gastroenterology
Gastroenterology

## 2023-10-26 NOTE — PROGRESS NOTE ADULT - ASSESSMENT
49 y/o Male with h/o HTN, migraine, cholecystectomy was admitted on 10/22 for LLQ pain x several days, but no fever. No n/v., but has diarrhea. Pain comes and goes and is very sharp, radiates to bladder area. Went to UC and sent to ED for further eval. In ER he received zosyn.     1. Acute sigmoid diverticulitis with microperforation and perisigmoid abscess.   -no clinical signs of sepsis  -on zosyn 3.375 gm IV q8h # 4  -tolerating abx well so far; no side effects noted  -abdomen is much improved  -tolerating PO intake  -surgical evaluation appreciated - conservative care  -abdomen is improving  -change abx to augmentin 875 gm PO q12h for 3 more weeks  -if recurrent fever, pain or diarrhea, will have to reconsider abx theray  -f/u with GO for repeat CT abdomen in about 2 wee  -monitor temps  -f/u CBC  -supportive care  2. Other issues:   -care per medicine  
48y old  Male who presents with a chief complaint of abdominal pain (25 Oct 2023 08:54). PMHX:  HTN presented to [facility] ED for eval LLQ pain. In ED found to have sigmoid diverticulitis with microperforation and admitted to surg service. Seen by Surgical team ans was started on IV abx's. Seen by ID Dr. Combs w/ writer @ bedside. Today reports he's been ambulating in hallway and passing wind, as well as had a BM today. Yolanda low reside diet. No N/V. No fever or chills. Last colonoscopy 11/2022.      Assessment /Plan: Acute sigmoid diverticulitis w/ microperforation    - ID Dr. Garret escobar and recommendations appreciated; c/w IV abx as directed  - Surgical Team reccomendations appreciated   - c/w low reside diet as yolanda; Nutritionist shawn appreciated  - OOB ambulating as yolanda  -Trend labs  -Will continue to follow, and likely d/c tomorrow      Above d/w GI attending Dr. Horn and Dr. Combs-ARCADIO Marcos, NP-C
Diverticulitis with microperf. Cont IV abx. Clear liquids.
Diverticulitis with microperf. Doing well. Continue IV abx. Advance to low residue diet. Encourage ambulation.
Imp:  Diverticulitis with microperf    Rec:  Cont abx
Diverticulitis with microperforation. Stable. Low residue diet. IV abx. Probable DC in am.

## 2023-11-02 NOTE — CDI QUERY NOTE - NSCDIOTHERTXTBX_GEN_ALL_CORE_HH
This patient was admitted with diverticulitis and your clarification is needed regarding the location of the abscess:    Discharge Note Provider 10-26-23 @ 12:49  Acute sigmoid diverticulitis with microperforation and perisigmoid abscess.    Can the location of the abscess be further specified?  -Intra abdominal abscess  -Pelvic abscess  -Other, please specify:

## 2023-11-07 DIAGNOSIS — Z90.49 ACQUIRED ABSENCE OF OTHER SPECIFIED PARTS OF DIGESTIVE TRACT: ICD-10-CM

## 2023-11-07 DIAGNOSIS — K57.20 DIVERTICULITIS OF LARGE INTESTINE WITH PERFORATION AND ABSCESS WITHOUT BLEEDING: ICD-10-CM

## 2023-11-07 DIAGNOSIS — I10 ESSENTIAL (PRIMARY) HYPERTENSION: ICD-10-CM

## 2023-11-07 DIAGNOSIS — Z79.899 OTHER LONG TERM (CURRENT) DRUG THERAPY: ICD-10-CM

## 2023-11-07 DIAGNOSIS — Z28.21 IMMUNIZATION NOT CARRIED OUT BECAUSE OF PATIENT REFUSAL: ICD-10-CM

## 2023-11-07 DIAGNOSIS — G47.33 OBSTRUCTIVE SLEEP APNEA (ADULT) (PEDIATRIC): ICD-10-CM

## 2023-11-07 DIAGNOSIS — G43.909 MIGRAINE, UNSPECIFIED, NOT INTRACTABLE, WITHOUT STATUS MIGRAINOSUS: ICD-10-CM

## 2023-11-07 DIAGNOSIS — K65.1 PERITONEAL ABSCESS: ICD-10-CM

## 2023-11-21 PROBLEM — Z00.00 ENCOUNTER FOR PREVENTIVE HEALTH EXAMINATION: Status: ACTIVE | Noted: 2023-11-21

## 2023-11-22 ENCOUNTER — APPOINTMENT (OUTPATIENT)
Dept: CT IMAGING | Facility: CLINIC | Age: 48
End: 2023-11-22
Payer: COMMERCIAL

## 2023-11-22 ENCOUNTER — OUTPATIENT (OUTPATIENT)
Dept: OUTPATIENT SERVICES | Facility: HOSPITAL | Age: 48
LOS: 1 days | End: 2023-11-22
Payer: COMMERCIAL

## 2023-11-22 DIAGNOSIS — Z00.8 ENCOUNTER FOR OTHER GENERAL EXAMINATION: ICD-10-CM

## 2023-11-22 DIAGNOSIS — Z98.52 VASECTOMY STATUS: Chronic | ICD-10-CM

## 2023-11-22 DIAGNOSIS — Z98.890 OTHER SPECIFIED POSTPROCEDURAL STATES: Chronic | ICD-10-CM

## 2023-11-22 DIAGNOSIS — Z90.49 ACQUIRED ABSENCE OF OTHER SPECIFIED PARTS OF DIGESTIVE TRACT: Chronic | ICD-10-CM

## 2023-11-22 PROCEDURE — 74177 CT ABD & PELVIS W/CONTRAST: CPT

## 2023-11-22 PROCEDURE — 74177 CT ABD & PELVIS W/CONTRAST: CPT | Mod: 26

## 2024-07-14 NOTE — ASU PATIENT PROFILE, ADULT - FALL HARM RISK - TYPE OF ASSESSMENT
Patient endorses worsening shortness of breath and difficulty breathing  Patient noted to have multiple diffuse scattered pulmonary nodules on CT  Quantiferon gold negative 7/9/24  Patient currently on room air    Plan  Completed Ceftriaxone x 5 days.  Tessalon Perles as needed   Admission